# Patient Record
Sex: FEMALE | Race: BLACK OR AFRICAN AMERICAN | Employment: FULL TIME | URBAN - METROPOLITAN AREA
[De-identification: names, ages, dates, MRNs, and addresses within clinical notes are randomized per-mention and may not be internally consistent; named-entity substitution may affect disease eponyms.]

---

## 2019-02-10 ENCOUNTER — APPOINTMENT (OUTPATIENT)
Dept: GENERAL RADIOLOGY | Age: 59
End: 2019-02-10
Payer: COMMERCIAL

## 2019-02-10 ENCOUNTER — HOSPITAL ENCOUNTER (EMERGENCY)
Age: 59
Discharge: HOME | End: 2019-02-10
Payer: COMMERCIAL

## 2019-02-10 VITALS
SYSTOLIC BLOOD PRESSURE: 179 MMHG | DIASTOLIC BLOOD PRESSURE: 109 MMHG | HEART RATE: 92 BPM | TEMPERATURE: 98.9 F | OXYGEN SATURATION: 97 % | RESPIRATION RATE: 18 BRPM | WEIGHT: 250 LBS | HEIGHT: 62 IN | BODY MASS INDEX: 46.01 KG/M2

## 2019-02-10 DIAGNOSIS — M17.0 TRICOMPARTMENT OSTEOARTHRITIS OF KNEES, BILATERAL: Primary | ICD-10-CM

## 2019-02-10 PROCEDURE — 73560 X-RAY EXAM OF KNEE 1 OR 2: CPT

## 2019-02-10 PROCEDURE — 99283 EMERGENCY DEPT VISIT LOW MDM: CPT

## 2019-02-10 PROCEDURE — 6370000000 HC RX 637 (ALT 250 FOR IP): Performed by: PHYSICIAN ASSISTANT

## 2019-02-10 RX ORDER — HYDROCODONE BITARTRATE AND ACETAMINOPHEN 5; 325 MG/1; MG/1
1 TABLET ORAL ONCE
Status: COMPLETED | OUTPATIENT
Start: 2019-02-10 | End: 2019-02-10

## 2019-02-10 RX ORDER — PREDNISONE 20 MG/1
40 TABLET ORAL ONCE
Status: COMPLETED | OUTPATIENT
Start: 2019-02-10 | End: 2019-02-10

## 2019-02-10 RX ORDER — LISINOPRIL AND HYDROCHLOROTHIAZIDE 20; 12.5 MG/1; MG/1
1 TABLET ORAL DAILY
Qty: 30 TABLET | Refills: 1 | Status: SHIPPED | OUTPATIENT
Start: 2019-02-10

## 2019-02-10 RX ORDER — HYDROCODONE BITARTRATE AND ACETAMINOPHEN 5; 325 MG/1; MG/1
1 TABLET ORAL EVERY 8 HOURS PRN
Qty: 15 TABLET | Refills: 0 | Status: SHIPPED | OUTPATIENT
Start: 2019-02-10 | End: 2019-02-15

## 2019-02-10 RX ORDER — PREDNISONE 10 MG/1
50 TABLET ORAL DAILY
Qty: 20 TABLET | Refills: 0 | Status: SHIPPED | OUTPATIENT
Start: 2019-02-10 | End: 2019-02-14

## 2019-02-10 RX ADMIN — PREDNISONE 40 MG: 20 TABLET ORAL at 17:19

## 2019-02-10 RX ADMIN — HYDROCODONE BITARTRATE AND ACETAMINOPHEN 1 TABLET: 5; 325 TABLET ORAL at 17:19

## 2019-02-10 ASSESSMENT — PAIN DESCRIPTION - ORIENTATION: ORIENTATION: RIGHT;LEFT

## 2019-02-10 ASSESSMENT — PAIN DESCRIPTION - PROGRESSION: CLINICAL_PROGRESSION: GRADUALLY IMPROVING

## 2019-02-10 ASSESSMENT — PAIN SCALES - GENERAL
PAINLEVEL_OUTOF10: 8
PAINLEVEL_OUTOF10: 3

## 2019-02-10 ASSESSMENT — PAIN DESCRIPTION - LOCATION: LOCATION: KNEE

## 2021-09-16 ENCOUNTER — APPOINTMENT (OUTPATIENT)
Dept: GENERAL RADIOLOGY | Age: 61
End: 2021-09-16
Payer: MEDICARE

## 2021-09-16 ENCOUNTER — APPOINTMENT (OUTPATIENT)
Dept: CT IMAGING | Age: 61
End: 2021-09-16
Payer: MEDICARE

## 2021-09-16 ENCOUNTER — HOSPITAL ENCOUNTER (EMERGENCY)
Age: 61
Discharge: HOME | End: 2021-09-17
Attending: EMERGENCY MEDICINE
Payer: MEDICARE

## 2021-09-16 VITALS
TEMPERATURE: 97.8 F | BODY MASS INDEX: 49.38 KG/M2 | DIASTOLIC BLOOD PRESSURE: 90 MMHG | HEART RATE: 104 BPM | OXYGEN SATURATION: 90 % | SYSTOLIC BLOOD PRESSURE: 170 MMHG | WEIGHT: 270 LBS | RESPIRATION RATE: 24 BRPM

## 2021-09-16 DIAGNOSIS — Z20.822 SUSPECTED COVID-19 VIRUS INFECTION: ICD-10-CM

## 2021-09-16 DIAGNOSIS — J98.01 BRONCHOSPASM: Primary | ICD-10-CM

## 2021-09-16 LAB
A/G RATIO: 1.1 (ref 1.1–2.2)
ALBUMIN SERPL-MCNC: 4.2 G/DL (ref 3.4–5)
ALP BLD-CCNC: 227 U/L (ref 40–129)
ALT SERPL-CCNC: 297 U/L (ref 10–40)
ANION GAP SERPL CALCULATED.3IONS-SCNC: 12 MMOL/L (ref 3–16)
AST SERPL-CCNC: 262 U/L (ref 15–37)
BACTERIA: ABNORMAL /HPF
BASOPHILS ABSOLUTE: 0 K/UL (ref 0–0.2)
BASOPHILS RELATIVE PERCENT: 1.1 %
BILIRUB SERPL-MCNC: 0.6 MG/DL (ref 0–1)
BILIRUBIN URINE: ABNORMAL
BLOOD, URINE: ABNORMAL
BUN BLDV-MCNC: 8 MG/DL (ref 7–20)
CALCIUM SERPL-MCNC: 9.1 MG/DL (ref 8.3–10.6)
CHLORIDE BLD-SCNC: 98 MMOL/L (ref 99–110)
CLARITY: ABNORMAL
CO2: 25 MMOL/L (ref 21–32)
COLOR: ABNORMAL
CREAT SERPL-MCNC: 0.8 MG/DL (ref 0.6–1.2)
EOSINOPHILS ABSOLUTE: 0 K/UL (ref 0–0.6)
EOSINOPHILS RELATIVE PERCENT: 0.6 %
EPITHELIAL CELLS, UA: 3 /HPF (ref 0–5)
GFR AFRICAN AMERICAN: >60
GFR NON-AFRICAN AMERICAN: >60
GLOBULIN: 4 G/DL
GLUCOSE BLD-MCNC: 129 MG/DL (ref 70–99)
GLUCOSE URINE: NEGATIVE MG/DL
HCT VFR BLD CALC: 39 % (ref 36–48)
HEMOGLOBIN: 13 G/DL (ref 12–16)
HYALINE CASTS: 4 /LPF (ref 0–8)
INR BLD: 1.01 (ref 0.88–1.12)
KETONES, URINE: ABNORMAL MG/DL
LEUKOCYTE ESTERASE, URINE: NEGATIVE
LIPASE: 30 U/L (ref 13–60)
LYMPHOCYTES ABSOLUTE: 1.3 K/UL (ref 1–5.1)
LYMPHOCYTES RELATIVE PERCENT: 39.2 %
MAGNESIUM: 2.1 MG/DL (ref 1.8–2.4)
MCH RBC QN AUTO: 30.7 PG (ref 26–34)
MCHC RBC AUTO-ENTMCNC: 33.2 G/DL (ref 31–36)
MCV RBC AUTO: 92.4 FL (ref 80–100)
MICROSCOPIC EXAMINATION: YES
MONOCYTES ABSOLUTE: 0.4 K/UL (ref 0–1.3)
MONOCYTES RELATIVE PERCENT: 12.4 %
NEUTROPHILS ABSOLUTE: 1.6 K/UL (ref 1.7–7.7)
NEUTROPHILS RELATIVE PERCENT: 46.7 %
NITRITE, URINE: POSITIVE
PDW BLD-RTO: 14.1 % (ref 12.4–15.4)
PH UA: 6.5 (ref 5–8)
PLATELET # BLD: 267 K/UL (ref 135–450)
PLATELET SLIDE REVIEW: ADEQUATE
PMV BLD AUTO: 8.7 FL (ref 5–10.5)
POTASSIUM SERPL-SCNC: 3.9 MMOL/L (ref 3.5–5.1)
PRO-BNP: 100 PG/ML (ref 0–124)
PROTEIN UA: 30 MG/DL
PROTHROMBIN TIME: 11.4 SEC (ref 9.9–12.7)
RBC # BLD: 4.22 M/UL (ref 4–5.2)
RBC UA: 99 /HPF (ref 0–4)
SLIDE REVIEW: ABNORMAL
SODIUM BLD-SCNC: 135 MMOL/L (ref 136–145)
SPECIFIC GRAVITY UA: >1.03 (ref 1–1.03)
TOTAL PROTEIN: 8.2 G/DL (ref 6.4–8.2)
TROPONIN: <0.01 NG/ML
URINE REFLEX TO CULTURE: ABNORMAL
URINE TYPE: ABNORMAL
UROBILINOGEN, URINE: 1 E.U./DL
WBC # BLD: 3.3 K/UL (ref 4–11)
WBC UA: 3 /HPF (ref 0–5)

## 2021-09-16 PROCEDURE — 74177 CT ABD & PELVIS W/CONTRAST: CPT

## 2021-09-16 PROCEDURE — 94760 N-INVAS EAR/PLS OXIMETRY 1: CPT

## 2021-09-16 PROCEDURE — 83735 ASSAY OF MAGNESIUM: CPT

## 2021-09-16 PROCEDURE — 71250 CT THORAX DX C-: CPT

## 2021-09-16 PROCEDURE — 80053 COMPREHEN METABOLIC PANEL: CPT

## 2021-09-16 PROCEDURE — 81001 URINALYSIS AUTO W/SCOPE: CPT

## 2021-09-16 PROCEDURE — 96374 THER/PROPH/DIAG INJ IV PUSH: CPT

## 2021-09-16 PROCEDURE — U0005 INFEC AGEN DETEC AMPLI PROBE: HCPCS

## 2021-09-16 PROCEDURE — U0003 INFECTIOUS AGENT DETECTION BY NUCLEIC ACID (DNA OR RNA); SEVERE ACUTE RESPIRATORY SYNDROME CORONAVIRUS 2 (SARS-COV-2) (CORONAVIRUS DISEASE [COVID-19]), AMPLIFIED PROBE TECHNIQUE, MAKING USE OF HIGH THROUGHPUT TECHNOLOGIES AS DESCRIBED BY CMS-2020-01-R: HCPCS

## 2021-09-16 PROCEDURE — 84484 ASSAY OF TROPONIN QUANT: CPT

## 2021-09-16 PROCEDURE — 85610 PROTHROMBIN TIME: CPT

## 2021-09-16 PROCEDURE — 6360000004 HC RX CONTRAST MEDICATION: Performed by: EMERGENCY MEDICINE

## 2021-09-16 PROCEDURE — 36415 COLL VENOUS BLD VENIPUNCTURE: CPT

## 2021-09-16 PROCEDURE — 2580000003 HC RX 258: Performed by: PHYSICIAN ASSISTANT

## 2021-09-16 PROCEDURE — 83880 ASSAY OF NATRIURETIC PEPTIDE: CPT

## 2021-09-16 PROCEDURE — 93005 ELECTROCARDIOGRAM TRACING: CPT | Performed by: EMERGENCY MEDICINE

## 2021-09-16 PROCEDURE — 87150 DNA/RNA AMPLIFIED PROBE: CPT

## 2021-09-16 PROCEDURE — 99283 EMERGENCY DEPT VISIT LOW MDM: CPT

## 2021-09-16 PROCEDURE — 6370000000 HC RX 637 (ALT 250 FOR IP): Performed by: PHYSICIAN ASSISTANT

## 2021-09-16 PROCEDURE — 96375 TX/PRO/DX INJ NEW DRUG ADDON: CPT

## 2021-09-16 PROCEDURE — 85025 COMPLETE CBC W/AUTO DIFF WBC: CPT

## 2021-09-16 PROCEDURE — 87186 SC STD MICRODIL/AGAR DIL: CPT

## 2021-09-16 PROCEDURE — 71045 X-RAY EXAM CHEST 1 VIEW: CPT

## 2021-09-16 PROCEDURE — 94640 AIRWAY INHALATION TREATMENT: CPT

## 2021-09-16 PROCEDURE — 6360000002 HC RX W HCPCS: Performed by: EMERGENCY MEDICINE

## 2021-09-16 PROCEDURE — 83690 ASSAY OF LIPASE: CPT

## 2021-09-16 PROCEDURE — 87040 BLOOD CULTURE FOR BACTERIA: CPT

## 2021-09-16 RX ORDER — 0.9 % SODIUM CHLORIDE 0.9 %
1000 INTRAVENOUS SOLUTION INTRAVENOUS ONCE
Status: COMPLETED | OUTPATIENT
Start: 2021-09-16 | End: 2021-09-16

## 2021-09-16 RX ORDER — ALBUTEROL SULFATE 90 UG/1
2 AEROSOL, METERED RESPIRATORY (INHALATION) ONCE
Status: COMPLETED | OUTPATIENT
Start: 2021-09-16 | End: 2021-09-16

## 2021-09-16 RX ORDER — METHYLPREDNISOLONE SODIUM SUCCINATE 125 MG/2ML
125 INJECTION, POWDER, LYOPHILIZED, FOR SOLUTION INTRAMUSCULAR; INTRAVENOUS ONCE
Status: COMPLETED | OUTPATIENT
Start: 2021-09-16 | End: 2021-09-16

## 2021-09-16 RX ORDER — ALBUTEROL SULFATE 2.5 MG/3ML
2.5 SOLUTION RESPIRATORY (INHALATION) ONCE
Status: COMPLETED | OUTPATIENT
Start: 2021-09-16 | End: 2021-09-16

## 2021-09-16 RX ADMIN — METHYLPREDNISOLONE SODIUM SUCCINATE 125 MG: 125 INJECTION, POWDER, FOR SOLUTION INTRAMUSCULAR; INTRAVENOUS at 21:08

## 2021-09-16 RX ADMIN — SODIUM CHLORIDE 1000 ML: 9 INJECTION, SOLUTION INTRAVENOUS at 19:53

## 2021-09-16 RX ADMIN — Medication 1000 MG: at 21:29

## 2021-09-16 RX ADMIN — IOPAMIDOL 75 ML: 755 INJECTION, SOLUTION INTRAVENOUS at 15:04

## 2021-09-16 RX ADMIN — ALBUTEROL SULFATE 2.5 MG: 2.5 SOLUTION RESPIRATORY (INHALATION) at 21:18

## 2021-09-16 RX ADMIN — Medication 2 PUFF: at 16:52

## 2021-09-16 ASSESSMENT — PAIN SCALES - GENERAL: PAINLEVEL_OUTOF10: 5

## 2021-09-16 NOTE — ED PROVIDER NOTES
[facility] Emergency Department      Pt Name: Angie Brock  MRN: [id number]  [place] 1960  Date of evaluation: 9/16/2021  Provider: Louis Garrett MD  I independently performed a history and physical on Angie Brock. All diagnostic, treatment, and disposition decisions were made by myself in conjunction with the advanced practice provider. HPI: Angie Brock presented with   Chief Complaint   Patient presents with    Abdominal Pain     pt states she has acid going on in her stomach for about a year. PT states she has not seen doctor because she does not have insurance. PT is on BP meds but does not take them as she should because she does not see a doctor     Angie Brock has a past medical history of Arthritis, Bursitis, and Hypertension. She has a past surgical history that includes Tubal ligation. No current facility-administered medications on file prior to encounter. Current Outpatient Medications on File Prior to Encounter   Medication Sig Dispense Refill    lisinopril-hydrochlorothiazide (PRINZIDE;ZESTORETIC) 20-12.5 MG per tablet Take 1 tablet by mouth daily 30 tablet 1     PHYSICAL EXAM  Vitals: BP (!) 179/99   Pulse 93   Temp 97.8 °F (36.6 °C) (Oral)   Resp 18   Wt 270 lb  SpO2 94%   BMI 49.38 kg/m²   Constitutional:  63 y.o. female alert  HENT:  Atraumatic, oral mucosa moist  Neck:  No visible JVD, supple  Chest/Lungs:  Respiratory effort normal, wheezes bilaterally  Abdomen:  Non-distended, soft, NT  Back:  No gross deformity  Extremities:  Normal tone and perfusion, no edema    Medical Decision Making and Plan: Briefly, this is an 63 y. o.female who presented with abdominal discomfort, acid reflux feeling, cough and congestion for a week. She is oxygenating well at this point time but has bronchospasm and abnormal findings on CT imaging. She has mild leukopenia. Findings are suspicious for Covid infection.   I have added on CT chest imaging to further assess. I will turn over her care to Dr Bhanu Oneil at the end of my shift. For further details of [facility] encounter, please see documentation by advanced practice provider Diamond Phoenix, PA.     Labs Reviewed   URINE RT REFLEX TO CULTURE - Abnormal; Notable for the following components:       Result Value    Clarity, UA TURBID (*)     Bilirubin Urine SMALL (*)     Ketones, Urine TRACE (*)     Blood, Urine LARGE (*)     Protein, UA 30 (*)     Nitrite, Urine POSITIVE (*)     All other components within normal limits    Narrative:     Performed at:  [facility] [address]   Phone [phone]   CBC WITH AUTO DIFFERENTIAL - Abnormal; Notable for the following components:    WBC 3.3 (*)     Neutrophils Absolute 1.6 (*)     All other components within normal limits    Narrative:     Performed at:  [facility] [address]   Phone [phone]   COMPREHENSIVE METABOLIC PANEL - Abnormal; Notable for the following components:    Sodium 135 (*)     Chloride 98 (*)     Glucose 129 (*)     Alkaline Phosphatase 227 (*)      (*)      (*)     All other components within normal limits    Narrative:     Performed at:  [facility] [address], [employer]   Phone [phone]   MICROSCOPIC URINALYSIS - Abnormal; Notable for the following components:    Bacteria, UA 4+ (*)     RBC, UA 99 (*)     All other components within normal limits    Narrative:     Performed at:  [facility] [address]   Phone [phone]   CULTURE, BLOOD 1   CULTURE, BLOOD 2   MAGNESIUM    Narrative:     Performed at:  [facility] [address]   Phone [phone]   LIPASE    Narrative:     Performed at:  [facility]  [address],  [place], [address]   Phone [phone]   TROPONIN    Narrative:     Performed at:  [facility]  [address],  [place], [address]   Phone [phone]   BRAIN NATRIURETIC PEPTIDE    Narrative:     Performed at:  [facility]  [address],  [place], [address]   Phone [phone]   PROTIME-INR    Narrative:     Performed at:  [facility]  [address],  [place], [address]   Phone [phone]     RADIOLOGY:     Plain x-rays were viewed by me:   CT ABDOMEN PELVIS W IV CONTRAST Additional Contrast? None   Preliminary Result   1. No acute process within the abdomen or pelvis. 2. Punctate nonobstructing right nephrolithiasis, without evidence of a   ureteral calculus or hydronephrosis. 3. Colonic diverticulosis, without evidence of diverticulitis. 4. Diffuse hepatic steatosis. 5. Small sliding-type hiatal hernia. 6. Multiple scattered ground-glass nodular opacities throughout the bilateral   lung bases, the largest measuring 2.2 cm within the lingula. Suggest further   characterization with immediate chest CT evaluation. XR CHEST PORTABLE   Final Result   Bilateral airspace disease suggestive of edema. Superimposed pneumonia not   excluded.          CT CHEST WO CONTRAST    (Results Pending)     EKG:  Read by me in the absence of a cardiologist shows: Sinus rhythm, rate 98, right bundle branch block, repolarization abnormalities from conduction delay, nonspecific ST-T wave abnormality, minimal change when compared to 24 April 2017    Medications administered:  Medications   albuterol (PROVENTIL) nebulizer solution 2.5 mg (has no administration in time range)   cefTRIAXone (ROCEPHIN) 1000 mg in sterile water 10 mL IV syringe (has no administration in time range)   albuterol sulfate  (90 Base) MCG/ACT inhaler 2 puff (2 puffs Inhalation Given 9/16/21 1652)   0.9 % sodium chloride bolus (0 mLs IntraVENous Stopped 9/16/21 2059)   iopamidol (ISOVUE-370) 76 % injection 75 mL (75 mLs IntraVENous Given 9/16/21 1504)   methylPREDNISolone sodium (SOLU-MEDROL) injection 125 mg (125 mg IntraVENous Given 9/16/21 2108)        Tong Tellez MD  09/16/21 2114

## 2021-09-16 NOTE — ED PROVIDER NOTES
[facility]        Pt Name: Monika Zuleta  MRN: [id number]  [place] 1960  Date of evaluation: 9/16/2021  Provider: Ebony Wade PA-C  PCP: No primary care provider on file. Note Started: 2:00 PM EDT        I have seen and evaluated this patient with my supervising physician No att. providers found. CHIEF COMPLAINT       Chief Complaint   Patient presents with    Abdominal Pain     pt states she has acid going on in her stomach for about a year. PT states she has not seen doctor because she does not have insurance. PT is on BP meds but does not take them as she should because she does not see a doctor       HISTORY OF PRESENT ILLNESS   (Location, Timing/Onset, Context/Setting, Quality, Duration, Modifying Factors, Severity, Associated Signs and Symptoms)  Note limiting factors. Chief Complaint: to much acid    Monika Zuleta is a 63 y.o. female who presents with complaint of known GERD and has been on omeprazole OTC 20 mg times years. States it is not working. She does typically take her omeprazole at night before going to bed and at times with a snack. She states she has not had insurance for a while and recently acquired insurance. The patient describes 1 month history of progressive sore throat, neck pain, headache, chest burning pain, shortness of breath, fatigue with just walking around her house. This is new and progressive over the past 1 month. She does have known history of hypertension currently on lisinopril 20 mg. She quit smoking 6 months ago onset age 35 for an equivalent of 21 pack years. She has not had a Covid vaccination. She does have a wheezy character to her cough. Nursing Notes were all reviewed and agreed with or any disagreements were addressed in the HPI.     REVIEW OF SYSTEMS    (2-9 systems for level 4, 10 or more for level 5)     Review of Systems    Positives and Pertinent negatives as per HPI. Except as noted above in the ROS, all other systems were reviewed and negative. PAST MEDICAL HISTORY     Past Medical History:   Diagnosis Date    Arthritis     Bursitis     left shoulder    Hypertension          SURGICAL HISTORY     Past Surgical History:   Procedure Laterality Date    TUBAL LIGATION      also reversed         CURRENTMEDICATIONS       Discharge Medication List as of 2021 12:15 AM      CONTINUE these medications which have NOT CHANGED    Details   lisinopril-hydrochlorothiazide (PRINZIDE;ZESTORETIC) 20-12.5 MG per tablet Take 1 tablet by mouth daily, Disp-30 tablet, R-1Print               ALLERGIES     Patient has no known allergies. FAMILYHISTORY     History reviewed. No pertinent family history. SOCIAL HISTORY       Social History     Tobacco Use    Smoking status: Former Smoker     Packs/day: 0.25     Quit date: 3/5/2017     Years since quittin.5    Smokeless tobacco: Never Used   Substance Use Topics    Alcohol use: No    Drug use: No       SCREENINGS             PHYSICAL EXAM    (up to 7 for level 4, 8 or more for level 5)     ED Triage Vitals [21 1333]   BP Temp Temp Source Pulse Resp SpO2 Height Weight   (!) 190/118 97.8 °F (36.6 °C) Oral 100 20 95 % -- 270 lb (122.5 kg)       Physical Exam  Vitals and nursing note reviewed. Constitutional:       Appearance: Normal appearance. She is well-developed. She is obese. HENT:      Head: Normocephalic and atraumatic. Right Ear: External ear normal.      Left Ear: External ear normal.      Mouth/Throat:      Pharynx: Oropharynx is clear. Eyes:      General: No scleral icterus. Right eye: No discharge. Left eye: No discharge. Conjunctiva/sclera: Conjunctivae normal.   Cardiovascular:      Rate and Rhythm: Regular rhythm. Tachycardia present. Heart sounds: Normal heart sounds.    Pulmonary:      Effort: Pulmonary effort is normal.      Breath sounds: Wheezing - Abnormal; Notable for the following components:    SARS-CoV-2 Detected (*)     All other components within normal limits    Narrative:     Performed at:  [facility]  [address] [facility] [address]   Phone [phone]   MICROSCOPIC URINALYSIS - Abnormal; Notable for the following components:    Bacteria, UA 4+ (*)     RBC, UA 99 (*)     All other components within normal limits    Narrative:     Performed at:  [facility] [address] [employer],  [place], [address]   Phone [phone]   CULTURE, BLOOD 1   CULTURE, BLOOD 2   MAGNESIUM    Narrative:     Performed at:  [facility] [address] [facility] [place],  [place], [address]   Phone [phone]   LIPASE    Narrative:     Performed at:  [facility] [address] [facility] [place],  [place], [address]   Phone [phone]   TROPONIN    Narrative:     Performed at:  [facility] [address] [facility] [place],  [place], [address]   Phone [phone]   BRAIN NATRIURETIC PEPTIDE    Narrative:     Performed at:  [facility] [address] [facility] [place],  [place], [address]   Phone [phone]   PROTIME-INR    Narrative:     Performed at:  [facility] [address], [address]   Phone [phone]       When ordered only abnormal lab results are displayed. All other labs were within normal range or not returned as of this dictation. EKG: When ordered, EKG's are interpreted by the Emergency Department Physician in the absence of a cardiologist.  Please see their note for interpretation of EKG.     RADIOLOGY:   Non-plain film images such as CT, Ultrasound and MRI are read by the radiologist. Plain radiographic images are visualized and preliminarily interpreted by the ED Provider with the below findings:        Interpretation per the Radiologist below, if injection 75 mL (75 mLs IntraVENous Given 9/16/21 1504)   albuterol (PROVENTIL) nebulizer solution 2.5 mg (2.5 mg Nebulization Given 9/16/21 2118)   methylPREDNISolone sodium (SOLU-MEDROL) injection 125 mg (125 mg IntraVENous Given 9/16/21 2108)   cefTRIAXone (ROCEPHIN) 1000 mg in sterile water 10 mL IV syringe (1,000 mg IntraVENous Given 9/16/21 2129)           My shift ends. I placed orders. The final disposition is managed by my attending physician. FINAL IMPRESSION      1. Bronchospasm    2. Suspected COVID-19 virus infection          DISPOSITION/PLAN   DISPOSITION Decision To Discharge 09/16/2021 11:57:26 PM      PATIENT REFERRED TO:  No follow-up provider specified.     DISCHARGE MEDICATIONS:  Discharge Medication List as of 9/17/2021 12:15 AM      START taking these medications    Details   albuterol sulfate HFA (VENTOLIN HFA) 108 (90 Base) MCG/ACT inhaler Inhale 2 puffs into the lungs 4 times daily as needed for Wheezing, Disp-54 g, R-1Print      azithromycin (ZITHROMAX) 250 MG tablet Take 1 tablet by mouth See Admin Instructions for 5 days 500mg on day 1 followed by 250mg on days 2 - 5, Disp-6 tablet, R-0Print      aspirin EC 81 MG EC tablet Take 1 tablet by mouth daily, Disp-90 tablet, R-1Print             DISCONTINUED MEDICATIONS:  Discharge Medication List as of 9/17/2021 12:15 AM      STOP taking these medications       Omega-3 Fatty Acids (OMEGA-3 FISH OIL) 500 MG CAPS Comments:   Reason for Stopping:         Cholecalciferol (VITAMIN D3) 5000 units TABS Comments:   Reason for Stopping:         Glucosamine-Chondroitin (GLUCOSAMINE CHONDR COMPLEX PO) Comments:   Reason for Stopping:         omeprazole (PRILOSEC) 20 MG delayed release capsule Comments:   Reason for Stopping:         ibuprofen (ADVIL;MOTRIN) 200 MG tablet Comments:   Reason for Stopping:         ibuprofen (ADVIL;MOTRIN) 400 MG tablet Comments:   Reason for Stopping:                      (Please note that portions of this note were completed with a voice recognition program.  Efforts were made to edit the dictations but occasionally words are mis-transcribed. )    Jm Hernandez PA-C (electronically signed)           Jm Hernandez PA-C  09/17/21 4304

## 2021-09-16 NOTE — ED NOTES
Bed: 19  Expected date:   Expected time:   Means of arrival:   Comments:  Anusha Scott RN  09/16/21 3414

## 2021-09-17 LAB
EKG ATRIAL RATE: 98 BPM
EKG DIAGNOSIS: NORMAL
EKG P AXIS: 77 DEGREES
EKG P-R INTERVAL: 130 MS
EKG Q-T INTERVAL: 378 MS
EKG QRS DURATION: 126 MS
EKG QTC CALCULATION (BAZETT): 482 MS
EKG R AXIS: 46 DEGREES
EKG T AXIS: 45 DEGREES
EKG VENTRICULAR RATE: 98 BPM
REPORT: NORMAL
SARS-COV-2: DETECTED

## 2021-09-17 PROCEDURE — 93010 ELECTROCARDIOGRAM REPORT: CPT | Performed by: INTERNAL MEDICINE

## 2021-09-17 RX ORDER — ASPIRIN 81 MG/1
81 TABLET ORAL DAILY
Qty: 90 TABLET | Refills: 1 | Status: SHIPPED | OUTPATIENT
Start: 2021-09-17

## 2021-09-17 RX ORDER — ALBUTEROL SULFATE 90 UG/1
2 AEROSOL, METERED RESPIRATORY (INHALATION) 4 TIMES DAILY PRN
Qty: 54 G | Refills: 1 | Status: SHIPPED | OUTPATIENT
Start: 2021-09-16

## 2021-09-17 RX ORDER — AZITHROMYCIN 250 MG/1
250 TABLET, FILM COATED ORAL SEE ADMIN INSTRUCTIONS
Qty: 6 TABLET | Refills: 0 | Status: ON HOLD | OUTPATIENT
Start: 2021-09-17 | End: 2021-09-22 | Stop reason: HOSPADM

## 2021-09-17 NOTE — ED NOTES
Discharge instructions reviewed with patient, denies any questions, discharged to home.      Cal Baltazar RN  09/17/21 9608

## 2021-09-17 NOTE — ED PROVIDER NOTES
Emergency Department Encounter  Location: [address]    Patient: Fartun Beal  MRN: [id number]  : 1960  Date of evaluation: 2021  ED Provider: Davion Isaac MD      Fartun Beal was checked out to me by Dr. Ya Najera 2100 Please see his/her initial documentation for details of the patient's initial ED presentation, physical exam and completed studies. In brief, Fartun Beal is a 63 y.o. female that presented to the emergency department with stomach pain, malaise x 1 week.  Has not been vaccinated for covid19    I have reviewed and interpreted all of the currently available lab results and diagnostics from this visit:    Labs  Results for orders placed or performed during the hospital encounter of 21   Urinalysis Reflex to Culture    Specimen: Urine, clean catch   Result Value Ref Range    Color, UA DK YELLOW Straw/Yellow    Clarity, UA TURBID (A) Clear    Glucose, Ur Negative Negative mg/dL    Bilirubin Urine SMALL (A) Negative    Ketones, Urine TRACE (A) Negative mg/dL    Specific Gravity, UA >1.030 1.005 - 1.030    Blood, Urine LARGE (A) Negative    pH, UA 6.5 5.0 - 8.0    Protein, UA 30 (A) Negative mg/dL    Urobilinogen, Urine 1.0 <2.0 E.U./dL    Nitrite, Urine POSITIVE (A) Negative    Leukocyte Esterase, Urine Negative Negative    Microscopic Examination YES     Urine Type NotGiven     Urine Reflex to Culture Not Indicated    CBC Auto Differential   Result Value Ref Range    WBC 3.3 (L) 4.0 - 11.0 K/uL    RBC 4.22 4.00 - 5.20 M/uL    Hemoglobin 13.0 12.0 - 16.0 g/dL    Hematocrit 39.0 36.0 - 48.0 %    MCV 92.4 80.0 - 100.0 fL    MCH 30.7 26.0 - 34.0 pg    MCHC 33.2 31.0 - 36.0 g/dL    RDW 14.1 12.4 - 15.4 %    Platelets 746 675 - 721 K/uL    MPV 8.7 5.0 - 10.5 fL    PLATELET SLIDE REVIEW Adequate     SLIDE REVIEW see below     Neutrophils % 46.7 %    Lymphocytes % 39.2 %    Monocytes % 12.4 %    Eosinophils % 0.6 %    Basophils % 1.1 % Neutrophils Absolute 1.6 (L) 1.7 - 7.7 K/uL    Lymphocytes Absolute 1.3 1.0 - 5.1 K/uL    Monocytes Absolute 0.4 0.0 - 1.3 K/uL    Eosinophils Absolute 0.0 0.0 - 0.6 K/uL    Basophils Absolute 0.0 0.0 - 0.2 K/uL   Comprehensive metabolic panel   Result Value Ref Range    Sodium 135 (L) 136 - 145 mmol/L    Potassium 3.9 3.5 - 5.1 mmol/L    Chloride 98 (L) 99 - 110 mmol/L    CO2 25 21 - 32 mmol/L    Anion Gap 12 3 - 16    Glucose 129 (H) 70 - 99 mg/dL    BUN 8 7 - 20 mg/dL    CREATININE 0.8 0.6 - 1.2 mg/dL    GFR Non-African American >60 >60    GFR African American >60 >60    Calcium 9.1 8.3 - 10.6 mg/dL    Total Protein 8.2 6.4 - 8.2 g/dL    Albumin 4.2 3.4 - 5.0 g/dL    Albumin/Globulin Ratio 1.1 1.1 - 2.2    Total Bilirubin 0.6 0.0 - 1.0 mg/dL    Alkaline Phosphatase 227 (H) 40 - 129 U/L     (H) 10 - 40 U/L     (H) 15 - 37 U/L    Globulin 4.0 g/dL   Magnesium   Result Value Ref Range    Magnesium 2.10 1.80 - 2.40 mg/dL   Lipase   Result Value Ref Range    Lipase 30.0 13.0 - 60.0 U/L   Troponin   Result Value Ref Range    Troponin <0.01 <0.01 ng/mL   Brain Natriuretic Peptide   Result Value Ref Range    Pro- 0 - 124 pg/mL   Protime-INR   Result Value Ref Range    Protime 11.4 9.9 - 12.7 sec    INR 1.01 0.88 - 1.12   Microscopic Urinalysis   Result Value Ref Range    Bacteria, UA 4+ (A) None Seen /HPF    Hyaline Casts, UA 4 0 - 8 /LPF    WBC, UA 3 0 - 5 /HPF    RBC, UA 99 (H) 0 - 4 /HPF    Epithelial Cells, UA 3 0 - 5 /HPF   EKG 12 Lead   Result Value Ref Range    Ventricular Rate 98 BPM    Atrial Rate 98 BPM    P-R Interval 130 ms    QRS Duration 126 ms    Q-T Interval 378 ms    QTc Calculation (Bazett) 482 ms    P Axis 77 degrees    R Axis 46 degrees    T Axis 45 degrees    Diagnosis       Normal sinus rhythmPossible Left atrial enlargementRight bundle branch blockAbnormal ECG       Imaging  CT CHEST WO CONTRAST   Final Result   Hazy ground-glass opacities scattered in both lungs which probably represents   early multisegmental bronchopneumonia. This pattern of pneumonia has been   described with COVID-19 disease. Recommend correlating with lab values. Small hiatal hernia which is unchanged. Borderline enlarged lymph nodes in the mediastinum which are probably   reactive in etiology. Suggest follow-up. Mild chronic liver changes         CT ABDOMEN PELVIS W IV CONTRAST Additional Contrast? None   Preliminary Result   1. No acute process within the abdomen or pelvis. 2. Punctate nonobstructing right nephrolithiasis, without evidence of a   ureteral calculus or hydronephrosis. 3. Colonic diverticulosis, without evidence of diverticulitis. 4. Diffuse hepatic steatosis. 5. Small sliding-type hiatal hernia. 6. Multiple scattered ground-glass nodular opacities throughout the bilateral   lung bases, the largest measuring 2.2 cm within the lingula. Suggest further   characterization with immediate chest CT evaluation. XR CHEST PORTABLE   Final Result   Bilateral airspace disease suggestive of edema. Superimposed pneumonia not   excluded. Patient was signed out to me by Dr. Leyla Brown pending a CT chest to further assess for findings of groundglass opacities in bilateral lung bases. Patient is oxygenating well and initially presented for acid reflux feeling in the epigastric region. There is no acute surgical process noted on CT of the abdomen and pelvis. She remains hemodynamically stable here. Likely COVID-19, formal swab is pending. Albuterol to be prescribed for home-going in the setting of bronchospasm with suspected viral infection. Will treat with azithromycin for findings of CAP as well. will also prescribe sera aspirin x 14 days for DVT ppx. Patient given strict precautions to return for any new or worsening symptoms, trouble breathing, chest pain or shortness of breath. Otherwise stable for PCP follow-up in 1 week. She is agreeable to plan.     Final Impression  1. Bronchospasm    2. Suspected COVID-19 virus infection        Blood pressure (!) 170/90, pulse 104, temperature 97.8 °F (36.6 °C), temperature source Oral, resp. rate 24, weight 270 lb (122.5 kg), SpO2 90 %. Disposition:  DISPOSITION        Patient Referrals:  No follow-up provider specified. Discharge Medications:  New Prescriptions    ALBUTEROL SULFATE HFA (VENTOLIN HFA) 108 (90 BASE) MCG/ACT INHALER    Inhale 2 puffs into the lungs 4 times daily as needed for Wheezing    ASPIRIN EC 81 MG EC TABLET    Take 1 tablet by mouth daily    AZITHROMYCIN (ZITHROMAX) 250 MG TABLET    Take 1 tablet by mouth See Admin Instructions for 5 days 500mg on day 1 followed by 250mg on days 2 - 5          [employer]    This chart was generated using the [employer] dictation system. I created this record but it may contain dictation errors given the limitations of this technology.        Alf Wang MD  09/16/21 4765       Alf Wang MD  09/17/21 0002       Alf Wang MD  09/17/21 0364

## 2021-09-19 LAB
BLOOD CULTURE, ROUTINE: ABNORMAL
BLOOD CULTURE, ROUTINE: ABNORMAL
CULTURE, BLOOD 2: ABNORMAL
ORGANISM: ABNORMAL

## 2021-09-20 ENCOUNTER — APPOINTMENT (OUTPATIENT)
Dept: GENERAL RADIOLOGY | Age: 61
DRG: 720 | End: 2021-09-20
Payer: MEDICARE

## 2021-09-20 ENCOUNTER — HOSPITAL ENCOUNTER (INPATIENT)
Age: 61
LOS: 2 days | Discharge: HOME | DRG: 720 | End: 2021-09-22
Attending: EMERGENCY MEDICINE | Admitting: FAMILY MEDICINE
Payer: MEDICARE

## 2021-09-20 ENCOUNTER — CARE COORDINATION (OUTPATIENT)
Dept: CARE COORDINATION | Age: 61
End: 2021-09-20

## 2021-09-20 DIAGNOSIS — R65.20 SEVERE SEPSIS (HCC): Primary | ICD-10-CM

## 2021-09-20 DIAGNOSIS — R78.81 BACTEREMIA: ICD-10-CM

## 2021-09-20 DIAGNOSIS — U07.1 COVID-19: ICD-10-CM

## 2021-09-20 DIAGNOSIS — R79.89 ELEVATED LACTIC ACID LEVEL: ICD-10-CM

## 2021-09-20 DIAGNOSIS — A41.9 SEVERE SEPSIS (HCC): Primary | ICD-10-CM

## 2021-09-20 PROBLEM — B95.8 BACTEREMIA DUE TO STAPHYLOCOCCUS: Status: ACTIVE | Noted: 2021-09-20

## 2021-09-20 LAB
A/G RATIO: 1.1 (ref 1.1–2.2)
ALBUMIN SERPL-MCNC: 4 G/DL (ref 3.4–5)
ALP BLD-CCNC: 188 U/L (ref 40–129)
ALT SERPL-CCNC: 230 U/L (ref 10–40)
ANION GAP SERPL CALCULATED.3IONS-SCNC: 16 MMOL/L (ref 3–16)
AST SERPL-CCNC: 106 U/L (ref 15–37)
BASOPHILS ABSOLUTE: 0.1 K/UL (ref 0–0.2)
BASOPHILS RELATIVE PERCENT: 2.8 %
BILIRUB SERPL-MCNC: 0.5 MG/DL (ref 0–1)
BUN BLDV-MCNC: 11 MG/DL (ref 7–20)
CALCIUM SERPL-MCNC: 9.3 MG/DL (ref 8.3–10.6)
CHLORIDE BLD-SCNC: 101 MMOL/L (ref 99–110)
CO2: 21 MMOL/L (ref 21–32)
CREAT SERPL-MCNC: 0.7 MG/DL (ref 0.6–1.2)
EOSINOPHILS ABSOLUTE: 0.1 K/UL (ref 0–0.6)
EOSINOPHILS RELATIVE PERCENT: 2.3 %
GFR AFRICAN AMERICAN: >60
GFR NON-AFRICAN AMERICAN: >60
GLOBULIN: 3.8 G/DL
GLUCOSE BLD-MCNC: 125 MG/DL (ref 70–99)
HCT VFR BLD CALC: 38.5 % (ref 36–48)
HEMOGLOBIN: 12.8 G/DL (ref 12–16)
INR BLD: 0.98 (ref 0.88–1.12)
LACTIC ACID, SEPSIS: 1.2 MMOL/L (ref 0.4–1.9)
LACTIC ACID, SEPSIS: 2.3 MMOL/L (ref 0.4–1.9)
LYMPHOCYTES ABSOLUTE: 1.2 K/UL (ref 1–5.1)
LYMPHOCYTES RELATIVE PERCENT: 32 %
MAGNESIUM: 1.8 MG/DL (ref 1.8–2.4)
MCH RBC QN AUTO: 30.9 PG (ref 26–34)
MCHC RBC AUTO-ENTMCNC: 33.3 G/DL (ref 31–36)
MCV RBC AUTO: 92.7 FL (ref 80–100)
MONOCYTES ABSOLUTE: 0.4 K/UL (ref 0–1.3)
MONOCYTES RELATIVE PERCENT: 12 %
NEUTROPHILS ABSOLUTE: 1.9 K/UL (ref 1.7–7.7)
NEUTROPHILS RELATIVE PERCENT: 50.9 %
PDW BLD-RTO: 14.1 % (ref 12.4–15.4)
PLATELET # BLD: 282 K/UL (ref 135–450)
PMV BLD AUTO: 9.1 FL (ref 5–10.5)
POTASSIUM REFLEX MAGNESIUM: 3.4 MMOL/L (ref 3.5–5.1)
PROCALCITONIN: 0.06 NG/ML (ref 0–0.15)
PROTHROMBIN TIME: 11.1 SEC (ref 9.9–12.7)
RBC # BLD: 4.15 M/UL (ref 4–5.2)
SODIUM BLD-SCNC: 138 MMOL/L (ref 136–145)
TOTAL PROTEIN: 7.8 G/DL (ref 6.4–8.2)
WBC # BLD: 3.7 K/UL (ref 4–11)

## 2021-09-20 PROCEDURE — 84145 PROCALCITONIN (PCT): CPT

## 2021-09-20 PROCEDURE — 6360000002 HC RX W HCPCS: Performed by: PHYSICIAN ASSISTANT

## 2021-09-20 PROCEDURE — 2580000003 HC RX 258: Performed by: PHYSICIAN ASSISTANT

## 2021-09-20 PROCEDURE — 6360000002 HC RX W HCPCS: Performed by: FAMILY MEDICINE

## 2021-09-20 PROCEDURE — 96365 THER/PROPH/DIAG IV INF INIT: CPT

## 2021-09-20 PROCEDURE — 85610 PROTHROMBIN TIME: CPT

## 2021-09-20 PROCEDURE — 83735 ASSAY OF MAGNESIUM: CPT

## 2021-09-20 PROCEDURE — 1200000000 HC SEMI PRIVATE

## 2021-09-20 PROCEDURE — 85025 COMPLETE CBC W/AUTO DIFF WBC: CPT

## 2021-09-20 PROCEDURE — G0378 HOSPITAL OBSERVATION PER HR: HCPCS

## 2021-09-20 PROCEDURE — 96372 THER/PROPH/DIAG INJ SC/IM: CPT

## 2021-09-20 PROCEDURE — 80074 ACUTE HEPATITIS PANEL: CPT

## 2021-09-20 PROCEDURE — 36415 COLL VENOUS BLD VENIPUNCTURE: CPT

## 2021-09-20 PROCEDURE — 83605 ASSAY OF LACTIC ACID: CPT

## 2021-09-20 PROCEDURE — 80053 COMPREHEN METABOLIC PANEL: CPT

## 2021-09-20 PROCEDURE — 87040 BLOOD CULTURE FOR BACTERIA: CPT

## 2021-09-20 PROCEDURE — 2580000003 HC RX 258: Performed by: FAMILY MEDICINE

## 2021-09-20 PROCEDURE — 96366 THER/PROPH/DIAG IV INF ADDON: CPT

## 2021-09-20 PROCEDURE — 99283 EMERGENCY DEPT VISIT LOW MDM: CPT

## 2021-09-20 PROCEDURE — 94760 N-INVAS EAR/PLS OXIMETRY 1: CPT

## 2021-09-20 PROCEDURE — 96375 TX/PRO/DX INJ NEW DRUG ADDON: CPT

## 2021-09-20 PROCEDURE — 71045 X-RAY EXAM CHEST 1 VIEW: CPT

## 2021-09-20 RX ORDER — SODIUM CHLORIDE 0.9 % (FLUSH) 0.9 %
5-40 SYRINGE (ML) INJECTION EVERY 12 HOURS SCHEDULED
Status: DISCONTINUED | OUTPATIENT
Start: 2021-09-20 | End: 2021-09-22 | Stop reason: HOSPADM

## 2021-09-20 RX ORDER — METOPROLOL SUCCINATE 50 MG/1
50 TABLET, EXTENDED RELEASE ORAL DAILY
Status: DISCONTINUED | OUTPATIENT
Start: 2021-09-21 | End: 2021-09-22 | Stop reason: HOSPADM

## 2021-09-20 RX ORDER — SODIUM CHLORIDE 9 MG/ML
25 INJECTION, SOLUTION INTRAVENOUS PRN
Status: DISCONTINUED | OUTPATIENT
Start: 2021-09-20 | End: 2021-09-22 | Stop reason: HOSPADM

## 2021-09-20 RX ORDER — ONDANSETRON 4 MG/1
4 TABLET, ORALLY DISINTEGRATING ORAL EVERY 8 HOURS PRN
Status: DISCONTINUED | OUTPATIENT
Start: 2021-09-20 | End: 2021-09-22 | Stop reason: HOSPADM

## 2021-09-20 RX ORDER — ONDANSETRON 2 MG/ML
4 INJECTION INTRAMUSCULAR; INTRAVENOUS EVERY 6 HOURS PRN
Status: DISCONTINUED | OUTPATIENT
Start: 2021-09-20 | End: 2021-09-22 | Stop reason: HOSPADM

## 2021-09-20 RX ORDER — HYDROCHLOROTHIAZIDE 25 MG/1
12.5 TABLET ORAL DAILY
Status: DISCONTINUED | OUTPATIENT
Start: 2021-09-21 | End: 2021-09-21

## 2021-09-20 RX ORDER — LISINOPRIL AND HYDROCHLOROTHIAZIDE 20; 12.5 MG/1; MG/1
1 TABLET ORAL DAILY
Status: DISCONTINUED | OUTPATIENT
Start: 2021-09-21 | End: 2021-09-20 | Stop reason: CLARIF

## 2021-09-20 RX ORDER — LISINOPRIL 20 MG/1
20 TABLET ORAL DAILY
Status: DISCONTINUED | OUTPATIENT
Start: 2021-09-21 | End: 2021-09-22 | Stop reason: HOSPADM

## 2021-09-20 RX ORDER — HYDRALAZINE HYDROCHLORIDE 20 MG/ML
10 INJECTION INTRAMUSCULAR; INTRAVENOUS EVERY 4 HOURS PRN
Status: DISCONTINUED | OUTPATIENT
Start: 2021-09-20 | End: 2021-09-22 | Stop reason: HOSPADM

## 2021-09-20 RX ORDER — AMLODIPINE BESYLATE 5 MG/1
5 TABLET ORAL DAILY
Status: DISCONTINUED | OUTPATIENT
Start: 2021-09-21 | End: 2021-09-22 | Stop reason: HOSPADM

## 2021-09-20 RX ORDER — ALBUTEROL SULFATE 90 UG/1
2 AEROSOL, METERED RESPIRATORY (INHALATION) 4 TIMES DAILY PRN
Status: DISCONTINUED | OUTPATIENT
Start: 2021-09-20 | End: 2021-09-22 | Stop reason: HOSPADM

## 2021-09-20 RX ORDER — POLYETHYLENE GLYCOL 3350 17 G/17G
17 POWDER, FOR SOLUTION ORAL DAILY PRN
Status: DISCONTINUED | OUTPATIENT
Start: 2021-09-20 | End: 2021-09-22 | Stop reason: HOSPADM

## 2021-09-20 RX ORDER — ASPIRIN 81 MG/1
81 TABLET ORAL DAILY
Status: DISCONTINUED | OUTPATIENT
Start: 2021-09-21 | End: 2021-09-22 | Stop reason: HOSPADM

## 2021-09-20 RX ORDER — ACETAMINOPHEN 650 MG/1
650 SUPPOSITORY RECTAL EVERY 6 HOURS PRN
Status: DISCONTINUED | OUTPATIENT
Start: 2021-09-20 | End: 2021-09-22 | Stop reason: HOSPADM

## 2021-09-20 RX ORDER — ACETAMINOPHEN 325 MG/1
650 TABLET ORAL EVERY 6 HOURS PRN
Status: DISCONTINUED | OUTPATIENT
Start: 2021-09-20 | End: 2021-09-22 | Stop reason: HOSPADM

## 2021-09-20 RX ORDER — MULTIVIT-MIN/IRON/FOLIC ACID/K 18-600-40
CAPSULE ORAL
COMMUNITY

## 2021-09-20 RX ORDER — SODIUM CHLORIDE 0.9 % (FLUSH) 0.9 %
5-40 SYRINGE (ML) INJECTION PRN
Status: DISCONTINUED | OUTPATIENT
Start: 2021-09-20 | End: 2021-09-22 | Stop reason: HOSPADM

## 2021-09-20 RX ADMIN — HYDRALAZINE HYDROCHLORIDE 10 MG: 20 INJECTION INTRAMUSCULAR; INTRAVENOUS at 22:27

## 2021-09-20 RX ADMIN — ENOXAPARIN SODIUM 40 MG: 40 INJECTION SUBCUTANEOUS at 22:30

## 2021-09-20 RX ADMIN — VANCOMYCIN HYDROCHLORIDE 1000 MG: 1 INJECTION, POWDER, LYOPHILIZED, FOR SOLUTION INTRAVENOUS at 18:34

## 2021-09-20 RX ADMIN — Medication 10 ML: at 22:18

## 2021-09-20 ASSESSMENT — ENCOUNTER SYMPTOMS
DIARRHEA: 1
VOMITING: 0
CONSTIPATION: 0
CHEST TIGHTNESS: 0
COUGH: 1
ABDOMINAL PAIN: 0
COLOR CHANGE: 0
NAUSEA: 0
PHOTOPHOBIA: 0
BACK PAIN: 0
SHORTNESS OF BREATH: 0

## 2021-09-20 ASSESSMENT — PAIN SCALES - GENERAL
PAINLEVEL_OUTOF10: 0
PAINLEVEL_OUTOF10: 0

## 2021-09-20 NOTE — ED PROVIDER NOTES
As physician-in-triage, I performed a medical screening history and physical exam on Joni Burns. I also cared for and evaluated the patient in conjunction with the ED Advanced Practice Provider. All diagnostic, treatment, and disposition decisions were made by myself in conjunction with the advanced practice provider. For all further details of the patient's emergency department visit, please see the advanced practice provider's documentation. Patient resents the ER for evaluation blood culture positive staph species, in setting of Covid, she continues to have transaminase elevations leukopenia, elevated lactate. Mild tachycardia with accelerated hypertension. Patient will receive IV vancomycin repeat cultures and will be treated for possible bacteremia versus contaminant. Mental status intact no pulse deficit. She will be admitted for IV antibiotics of vancomycin which the staph subspecies was sensitive to. Impression: Bacteremia, COVID-19, elevated lactate, elevated transaminases    Total critical care time provided today was 31 minutes. This excludes seperately billable procedures and family discussion time. Critical care time provided for obtaining history, conducting a physical exam, performing and monitoring interventions, ordering, collecting and interpreting tests, and establishing medical decision-making. There was a potential for life/limb threatening pathology requiring close evaluation and intervention with concern for patient decompensation.        Jaqui Ochoa MD  90/80/95 0409       Jaqui Ochoa MD  43/48/65 [address] [facility] MD [place]  26/94/47 7415

## 2021-09-20 NOTE — ED NOTES
[facility]   Emergency Department Culture Follow-Up       Srini Costello (CSN: [id number]) was seen and evaluated at [facility] Emergency Department on 9/16/21 by provider  Dr. Quincy Gan. A blood culture was positive and is growing Staph hominis. Sensitivity results: Sensitive to tetracycline and Vancomycin      Treatment Course: The patient was treated and discharged with Azithromycin. Recommendation:    Recommends reevaluation and possibly readmission    This recommendation was reviewed with and agreed by ED provider Dr. Evelin Kerr. Follow-Up:    The patient was called and notified to come back to the ED for reevaluation d/t 2/2 blood cxs growing staph. Pt verbalized understanding.      Thank you,    Piter Rose, [facility]  9/20/2021

## 2021-09-21 LAB
A/G RATIO: 1 (ref 1.1–2.2)
ALBUMIN SERPL-MCNC: 3.7 G/DL (ref 3.4–5)
ALP BLD-CCNC: 170 U/L (ref 40–129)
ALT SERPL-CCNC: 201 U/L (ref 10–40)
ANION GAP SERPL CALCULATED.3IONS-SCNC: 13 MMOL/L (ref 3–16)
AST SERPL-CCNC: 101 U/L (ref 15–37)
BACTERIA: ABNORMAL /HPF
BILIRUB SERPL-MCNC: 0.7 MG/DL (ref 0–1)
BILIRUBIN URINE: ABNORMAL
BLOOD, URINE: ABNORMAL
BUN BLDV-MCNC: 11 MG/DL (ref 7–20)
C-REACTIVE PROTEIN: 22.2 MG/L (ref 0–5.1)
CALCIUM SERPL-MCNC: 9 MG/DL (ref 8.3–10.6)
CHLORIDE BLD-SCNC: 103 MMOL/L (ref 99–110)
CLARITY: ABNORMAL
CO2: 21 MMOL/L (ref 21–32)
COLOR: ABNORMAL
CREAT SERPL-MCNC: 0.6 MG/DL (ref 0.6–1.2)
D DIMER: 310 NG/ML DDU (ref 0–229)
EPITHELIAL CELLS, UA: ABNORMAL /HPF (ref 0–5)
GFR AFRICAN AMERICAN: >60
GFR NON-AFRICAN AMERICAN: >60
GLOBULIN: 3.6 G/DL
GLUCOSE BLD-MCNC: 118 MG/DL (ref 70–99)
GLUCOSE URINE: NEGATIVE MG/DL
HAV IGM SER IA-ACNC: NORMAL
HCT VFR BLD CALC: 35.4 % (ref 36–48)
HEMOGLOBIN: 11.8 G/DL (ref 12–16)
HEPATITIS B CORE IGM ANTIBODY: NORMAL
HEPATITIS B SURFACE ANTIGEN INTERPRETATION: NORMAL
HEPATITIS C ANTIBODY INTERPRETATION: NORMAL
KETONES, URINE: 15 MG/DL
LEUKOCYTE ESTERASE, URINE: NEGATIVE
MCH RBC QN AUTO: 30.7 PG (ref 26–34)
MCHC RBC AUTO-ENTMCNC: 33.4 G/DL (ref 31–36)
MCV RBC AUTO: 91.9 FL (ref 80–100)
MICROSCOPIC EXAMINATION: YES
NITRITE, URINE: NEGATIVE
PDW BLD-RTO: 14.2 % (ref 12.4–15.4)
PH UA: 6 (ref 5–8)
PLATELET # BLD: 302 K/UL (ref 135–450)
PMV BLD AUTO: 9.3 FL (ref 5–10.5)
POTASSIUM SERPL-SCNC: 3.7 MMOL/L (ref 3.5–5.1)
PROTEIN UA: 30 MG/DL
RBC # BLD: 3.85 M/UL (ref 4–5.2)
RBC UA: ABNORMAL /HPF (ref 0–4)
SODIUM BLD-SCNC: 137 MMOL/L (ref 136–145)
SPECIFIC GRAVITY UA: 1.03 (ref 1–1.03)
TOTAL PROTEIN: 7.3 G/DL (ref 6.4–8.2)
URINE REFLEX TO CULTURE: ABNORMAL
URINE TYPE: ABNORMAL
UROBILINOGEN, URINE: 1 E.U./DL
VANCOMYCIN RANDOM: <4 UG/ML
WBC # BLD: 5.6 K/UL (ref 4–11)
WBC UA: ABNORMAL /HPF (ref 0–5)

## 2021-09-21 PROCEDURE — 2500000003 HC RX 250 WO HCPCS: Performed by: INTERNAL MEDICINE

## 2021-09-21 PROCEDURE — 94761 N-INVAS EAR/PLS OXIMETRY MLT: CPT

## 2021-09-21 PROCEDURE — 80053 COMPREHEN METABOLIC PANEL: CPT

## 2021-09-21 PROCEDURE — 2580000003 HC RX 258: Performed by: FAMILY MEDICINE

## 2021-09-21 PROCEDURE — 2580000003 HC RX 258

## 2021-09-21 PROCEDURE — 81001 URINALYSIS AUTO W/SCOPE: CPT

## 2021-09-21 PROCEDURE — 85027 COMPLETE CBC AUTOMATED: CPT

## 2021-09-21 PROCEDURE — 6360000002 HC RX W HCPCS: Performed by: FAMILY MEDICINE

## 2021-09-21 PROCEDURE — 36415 COLL VENOUS BLD VENIPUNCTURE: CPT

## 2021-09-21 PROCEDURE — G0378 HOSPITAL OBSERVATION PER HR: HCPCS

## 2021-09-21 PROCEDURE — 2580000003 HC RX 258: Performed by: INTERNAL MEDICINE

## 2021-09-21 PROCEDURE — 85379 FIBRIN DEGRADATION QUANT: CPT

## 2021-09-21 PROCEDURE — 94640 AIRWAY INHALATION TREATMENT: CPT

## 2021-09-21 PROCEDURE — 6370000000 HC RX 637 (ALT 250 FOR IP): Performed by: FAMILY MEDICINE

## 2021-09-21 PROCEDURE — 80202 ASSAY OF VANCOMYCIN: CPT

## 2021-09-21 PROCEDURE — 96366 THER/PROPH/DIAG IV INF ADDON: CPT

## 2021-09-21 PROCEDURE — 6360000002 HC RX W HCPCS: Performed by: INTERNAL MEDICINE

## 2021-09-21 PROCEDURE — 86140 C-REACTIVE PROTEIN: CPT

## 2021-09-21 PROCEDURE — 96367 TX/PROPH/DG ADDL SEQ IV INF: CPT

## 2021-09-21 PROCEDURE — 1200000000 HC SEMI PRIVATE

## 2021-09-21 PROCEDURE — 99255 IP/OBS CONSLTJ NEW/EST HI 80: CPT | Performed by: INTERNAL MEDICINE

## 2021-09-21 RX ORDER — SODIUM CHLORIDE 9 MG/ML
INJECTION, SOLUTION INTRAVENOUS
Status: COMPLETED
Start: 2021-09-21 | End: 2021-09-21

## 2021-09-21 RX ADMIN — ENOXAPARIN SODIUM 40 MG: 40 INJECTION SUBCUTANEOUS at 20:53

## 2021-09-21 RX ADMIN — ALBUTEROL SULFATE 2 PUFF: 90 AEROSOL, METERED RESPIRATORY (INHALATION) at 04:55

## 2021-09-21 RX ADMIN — ASPIRIN 81 MG: 81 TABLET, COATED ORAL at 09:20

## 2021-09-21 RX ADMIN — HYDROCHLOROTHIAZIDE 12.5 MG: 25 TABLET ORAL at 09:20

## 2021-09-21 RX ADMIN — CASIRIVIMAB AND IMDEVIMAB: 600; 600 INJECTION, SOLUTION, CONCENTRATE INTRAVENOUS at 14:57

## 2021-09-21 RX ADMIN — Medication 10 ML: at 20:53

## 2021-09-21 RX ADMIN — VANCOMYCIN HYDROCHLORIDE 2000 MG: 10 INJECTION, POWDER, LYOPHILIZED, FOR SOLUTION INTRAVENOUS at 09:17

## 2021-09-21 RX ADMIN — SODIUM CHLORIDE 25 ML: 9 INJECTION, SOLUTION INTRAVENOUS at 14:55

## 2021-09-21 RX ADMIN — LISINOPRIL 20 MG: 20 TABLET ORAL at 09:20

## 2021-09-21 RX ADMIN — METOPROLOL SUCCINATE 50 MG: 50 TABLET, EXTENDED RELEASE ORAL at 09:20

## 2021-09-21 RX ADMIN — Medication 10 ML: at 09:15

## 2021-09-21 RX ADMIN — ENOXAPARIN SODIUM 40 MG: 40 INJECTION SUBCUTANEOUS at 09:20

## 2021-09-21 RX ADMIN — AMLODIPINE BESYLATE 5 MG: 5 TABLET ORAL at 09:20

## 2021-09-21 ASSESSMENT — ENCOUNTER SYMPTOMS
FACIAL SWELLING: 0
PHOTOPHOBIA: 0
STRIDOR: 0
SHORTNESS OF BREATH: 1
EYE REDNESS: 0
COUGH: 1
EYE DISCHARGE: 0
TROUBLE SWALLOWING: 0
CHEST TIGHTNESS: 0
NAUSEA: 0
CHOKING: 0
BLOOD IN STOOL: 0
ABDOMINAL PAIN: 0
DIARRHEA: 0
APNEA: 0
COLOR CHANGE: 0
RHINORRHEA: 0

## 2021-09-21 ASSESSMENT — PAIN SCALES - GENERAL
PAINLEVEL_OUTOF10: 0

## 2021-09-21 NOTE — CARE COORDINATION
CM reviewed chart. Pt is from home, called back for + blood culture. ID consult pending. Pt Covid + on 9/17. On room air. CM called Ann-Marie with [employer] [phone] and she will check IV benefits and find hc for pt's [place] insurance. Cm called room d/t + covid status and no answer at this time. Pt has no PCP listed, will need verified.     Win Garcia RN, BSN  [phone]

## 2021-09-21 NOTE — PROGRESS NOTES
Referral to check infusion benefits. Patients benefits information is as follows: Therapy: Vancomyocin 1 gm every 12 hours    Co- [address] covers drug and supplies 100%   Care  [facility]  has accepted this referral for home care. Benefits per: Ann-Marie @ [facility]. [facility] [phone]  / Office: [phone]    Discharge planner notified.

## 2021-09-21 NOTE — PROGRESS NOTES
Patient arrived to the unit in stable condition. Patient oriented to room and use of call light. Patient alert and oriented x4. Call light within reach, no other needs expressed.

## 2021-09-21 NOTE — H&P
HOSPITALISTS HISTORY AND PHYSICAL    9/20/2021 10:09 PM    Patient Information:  Norman Du is a 63 y.o. female [id number]  PCP:  No primary care provider on file. (Tel: None )    Chief complaint:    Chief Complaint   Patient presents with    Abnormal Lab     Told to return to ER for bactria in blood culture. History of Present Illness:  Malka Worley is a 63 y.o. female with h/o HTN , former smoker, was called by [facility] ED d.t abnormal blood cultures. She was seen at the ED 09/16 with abdominal pain , flu like symptoms, and dyspnea. The symptoms are On going for several weeks  The pt was not febrile of hypoxic. She was dc home with albuterol and Zpack. The blood cultures were drawn at the time . 2/2 bottles are positive for staphylococcus hominis . Pt has low grade fever 99.1. Chest xray showed airspace disease and pneumonia . sats are > 95% on RA  REVIEW OF SYSTEMS:   Constitutional: +Ve for fever,chills or night sweats  ENT: Negative for rhinorrhea, epistaxis, hoarseness, sore throat. Respiratory: +Ve  for shortness of breath,wheezing  Cardiovascular: Negative for chest pain, palpitations   Gastrointestinal: Negative for nausea, vomiting, diarrhea  Genitourinary: Negative for polyuria, dysuria   Hematologic/Lymphatic: Negative for bleeding tendency, easy bruising  Musculoskeletal: Negative for myalgias and arthralgias  Neurologic: Negative for confusion,dysarthria. Skin: Negative for itching,rash  Psychiatric: Negative for depression,anxiety, agitation. Endocrine: Negative for polydipsia,polyuria,heat /cold intolerance. Past Medical History:   has a past medical history of Arthritis, Bursitis, and Hypertension. Past Surgical History:   has a past surgical history that includes Tubal ligation. Medications:  No current facility-administered medications on file prior to encounter.      Current Outpatient Medications on File Prior to Encounter   Medication Sig Dispense Refill    Cholecalciferol (VITAMIN D) 50 MCG (2000 UT) CAPS capsule Take by mouth      albuterol sulfate HFA (VENTOLIN HFA) 108 (90 Base) MCG/ACT inhaler Inhale 2 puffs into the lungs 4 times daily as needed for Wheezing 54 g 1    azithromycin (ZITHROMAX) 250 MG tablet Take 1 tablet by mouth See Admin Instructions for 5 days 500mg on day 1 followed by 250mg on days 2 - 5 6 tablet 0    aspirin EC 81 MG EC tablet Take 1 tablet by mouth daily 90 tablet 1    lisinopril-hydrochlorothiazide (PRINZIDE;ZESTORETIC) 20-12.5 MG per tablet Take 1 tablet by mouth daily 30 tablet 1     Current Facility-Administered Medications   Medication Dose Route Frequency Provider Last Rate Last Admin    [START ON 9/21/2021] lisinopril-hydroCHLOROthiazide (PRINZIDE;ZESTORETIC) 20-12.5 MG per tablet 1 tablet  1 tablet Oral Daily Lauren Jalloh MD        albuterol sulfate  (90 Base) MCG/ACT inhaler 2 puff  2 puff Inhalation 4x Daily PRN MD Brenna Navas Bias ON 9/21/2021] aspirin EC tablet 81 mg  81 mg Oral Daily Lauren Jalloh MD        sodium chloride flush 0.9 % injection 5-40 mL  5-40 mL IntraVENous 2 times per day Shawn Bello MD        sodium chloride flush 0.9 % injection 5-40 mL  5-40 mL IntraVENous PRN Shawn Bello MD        0.9 % sodium chloride infusion  25 mL IntraVENous PRN MD Brenna Navas ON 9/21/2021] enoxaparin (LOVENOX) injection 40 mg  40 mg SubCUTAneous Daily Lauren Jalloh MD        ondansetron (ZOFRAN-ODT) disintegrating tablet 4 mg  4 mg Oral Q8H PRN Shawn Bello MD        Or    ondansetron (ZOFRAN) injection 4 mg  4 mg IntraVENous Q6H PRN Shawn Bello MD        polyethylene glycol (GLYCOLAX) packet 17 g  17 g Oral Daily PRN Shawn Bello MD        acetaminophen (TYLENOL) tablet 650 mg  650 mg Oral Q6H PRN Shawn Bello MD        Or   Brenna Proctor acetaminophen (TYLENOL) suppository 650 mg  650 mg Rectal Q6H PRN MD Brenna Navas 09/20/2021    CREATININE 0.7 09/20/2021    CALCIUM 9.3 09/20/2021    GFRAA >60 09/20/2021    LABGLOM >60 09/20/2021    GLUCOSE 125 09/20/2021       Chest Xray:   EKG:        Problem List  Active Problems:    Bacteremia due to Staphylococcus  Resolved Problems:    * No resolved hospital problems. *        Assessment/Plan:         1. Staphylococcus bacteremia 2/2 bottles  Started on IV Vancomycin   ID consulted     COVID pneumonia   Pt saturation s > 95% on RA  Supportive care at this time  Cultures pending       HTN uncontrolled 189/81  Started on Norvasc, and metoprolol  Cont home dose of HCTZ/ ACEi   Added prn hydralazine    Elevated liver enzymes   Could be d/t acute viral infection   Trending down compared to 09/16/21  Will get liver US and hep panel   Cont to monitor     Obesity BMI 54    Admit as inpatient. I anticipate hospitalization spanning mor than two midnights for investigation and treatment of the above medically necessary diagnoses.       Jhon Theodore MD    9/20/2021 10:09 PM

## 2021-09-21 NOTE — PROGRESS NOTES
Cranial nerves: II-XII intact, grossly non-focal.  Psychiatric: Alert and oriented, thought content appropriate, normal insight  Capillary Refill: Brisk,3 seconds, normal   Peripheral Pulses: +2 palpable, equal bilaterally       Labs:   Recent Labs     09/20/21  1302 09/21/21  0525   WBC 3.7* 5.6   HGB 12.8 11.8*   HCT 38.5 35.4*    302     Recent Labs     09/20/21  1302 09/21/21  0525    137   K 3.4* 3.7    103   CO2 21 21   BUN 11 11   CREATININE 0.7 0.6   CALCIUM 9.3 9.0     Recent Labs     09/20/21  1302 09/21/21  0525   * 101*   * 201*   BILITOT 0.5 0.7   ALKPHOS 188* 170*     Recent Labs     09/20/21  1302   INR 0.98     No results for input(s): Esteban Oiler in the last 72 hours. Urinalysis:      Lab Results   Component Value Date    NITRU Negative 09/21/2021    WBCUA 3-5 09/21/2021    BACTERIA Rare 09/21/2021    RBCUA 5-10 09/21/2021    BLOODU MODERATE 09/21/2021    SPECGRAV 1.028 09/21/2021    GLUCOSEU Negative 09/21/2021       Radiology:  XR CHEST PORTABLE   Final Result   Subtle, peripheral airspace disease. Pattern is common for COVID pneumonia. Other etiologies of pneumonia, typical or atypical etiology or eosinophilic   pneumonia are other possibilities. US LIVER    (Results Pending)           Assessment/Plan:    Active Hospital Problems    Diagnosis     Elevated lactic acid level [R79.89]     Pneumonia due to COVID-19 virus [U07.1, J12.82]     Lactic acidosis [E87.2]     Transaminitis [R74.01]     Mediastinal lymphadenopathy [R59.0]     Morbid obesity due to excess calories (HCC) [E66.01]     Weight loss counseling, encounter for [Z71.3]     Positive blood culture [R78.81]      Coag negative staph on blood cultures: 2 x 2. Thought to be contaminant. Procalcitonin level. Blood cultures negative. Antibiotics stopped by ID. COVID-19 infection: Chest x-ray with pneumonia. Tested positive on 9/16/2021. Onset of symptoms on 9/7/2021. Currently on room air. Unvaccinated. CRP 22.2. D-dimer 310. Procalcitonin 0.06. Discussed with ID. Give regen-cov antibodies today. Continue antitussives. Albuterol as needed. Hypertension: Continue Norvasc and metoprolol. ,  Hydrochlorothiazide and lisinopril      Transaminitis: Possibly secondary to COVID-19 infection. Acute hep panel negative. Right upper quadrant ultrasound pending. Morbid obesity: BMI 54.44    DVT Prophylaxis: Lovenox  Diet: ADULT DIET; Regular  Code Status: Full Code    PT/OT Eval Status: Not needed    Dispo - inpatient on Monday. Discharge possibly tomorrow.     Kit Curtis MD

## 2021-09-21 NOTE — CONSULTS
Clinical Pharmacy Note: Pharmacy to Dose Vancomcyin    Vancomycin Day: 2  Current Dosing Regimen: 1g q 12 hrs  Dosing Method: Bayesian Modeling    Random: <4    Recent Labs     09/20/21  1302 09/21/21  0525   BUN 11 11       Recent Labs     09/20/21  1302 09/21/21  0525   CREATININE 0.7 0.6       Recent Labs     09/20/21  1302 09/21/21  0525   WBC 3.7* 5.6         Intake/Output Summary (Last 24 hours) at 9/21/2021 0736  Last data filed at 9/20/2021 2130  Gross per 24 hour   Intake 250 ml   Output --   Net 250 ml         Ht Readings from Last 1 Encounters:   09/20/21 5' 3\" (1.6 m)        Wt Readings from Last 1 Encounters:   09/20/21 (!) 307 lb 14.4 oz (139.7 kg)         Body mass index is 54.54 kg/m². Estimated Creatinine Clearance: 136 mL/min (based on SCr of 0.6 mg/dL). Assessment/Plan:  Vancomycin level is subtherapeutic. Level was drawn appropriately in respect to last dose given. Increase vancomycin regimen to 2g  every 12 hours. Bayesian Modeling predicts an AUC of 529 mg/L*hr and trough of 8 mg/L. A vancomycin random level has been ordered on 9/23 at 0600 for follow-up. Changes in regimen will be determined based on culture results, renal function, and clinical response. Pharmacy will continue to monitor and adjust regimen as necessary.     Thank you for the consult,    Sinan OrozcoD, BCPS

## 2021-09-21 NOTE — CONSULTS
Infectious Diseases   Consult Note        Admission Date: 9/20/2021  Hospital Day: Hospital Day: 2   Attending: Sandra Ivory MD  Date of service: 9/21/21     Reason for admission: Bacteremia [R78.81]  Elevated lactic acid level [R79.89]  Bacteremia due to Staphylococcus [R78.81, B95.8]  Severe sepsis ([address] [A41.9, R65.20]  COVID-19 [U07.1]    Chief complaint/ Reason for consult: COVID-19 infection, positive blood cultures    Microbiology:        I have reviewed allavailable micro lab data and cultures    · Blood culture (2/2) - collected on 9/20/2021: In process      Antibiotics and immunizations:       Current antibiotics: All antibiotics and their doses were reviewed by me    Recent Abx Admin                   vancomycin (VANCOCIN) 2,000 mg in dextrose 5 % 500 mL IVPB (mg) 2,000 mg New Bag 09/21/21 0917    vancomycin 1000 mg IVPB in 250 mL D5W addavial (mg) 1,000 mg New Bag 09/20/21 1834                  Immunization History: All immunization history was reviewed by me today. There is no immunization history on file for this patient. Known drug allergies: All allergies were reviewed and updated    No Known Allergies    Social history:     Social History:  All social andepidemiologic history was reviewed and updated by me today as needed. · Tobacco use:   reports that she quit smoking about 4 years ago. She smoked 0.25 packs per day. She has never used smokeless tobacco.  · Alcohol use:   reports no history of alcohol use. · Currently lives in: [facility]  ·  reports no history of drug use. COVID VACCINATION AND LAB RESULT RECORDS:     Internal Administration   First Dose      Second Dose           Last COVID Lab SARS-CoV-2 (no units)   Date Value   09/16/2021 Detected (A)            Assessment:     The patient is a 63 y.o. old female who  has a past medical history of Arthritis, Bursitis, and Hypertension.  with following problems:    · COVID-19 pneumonia  · Unvaccinated against COVID-19  · Lactic acidosis  · Positive blood cultures for Staph hominis, contaminant from the skin  · Transaminitis, likely in setting of COVID-19  · Mediastinal lymphadenopathy, likely in setting of COVID-19  · Obesity Class 3 due to excess calorie intake : Body mass index is 54.54 kg/m². ·       Discussion:      The patient is unvaccinated against COVID-19 and has been admitted with concern for positive blood culture. I reviewed patient's culture results in detail. Both sets of blood cultures were obtained on the same date at the same time from the same site i.e left antecubital fossa, hand has technically represent 1 set and not two sets of blood cultures. The Staphylococcus hominis isolated in this set of blood culture is very likely contaminant from the skin. I do not think patient needs further antibiotics. As far as COVID-19 is concerned, the patient is unvaccinated. Due to her age, morbid obesity, she is at significant risk of complications from [id number]    Her procalcitonin was also 0.06, which goes against a bacterial process      I reviewed her portable chest x-ray from yesterday. She is developing bilateral atypical lung infiltrates, classic for COVID-19. She is quite early in the COVID-19 disease at this time    Plan:     Diagnostic Workup:    · Agree with repeat blood cultures  · Will order baseline CRP, LDH level  · Continue to follow fever curve, WBC count and blood cultures  · Follow up on liverand renal functions closely    Antimicrobials:    · Will stop IV vancomycin. Staph hominis in the blood culture is highly likely a contaminant from the skin. No need of antibiotics  · The patient is at high risk of complicated report from [id number] pneumonia as discussed above. The patient is currently saturating 95 to 96% on room air. · The patient would be a candidate for COVID-19 Regeneron antibody treatment at this time.   This was discussed with the patient and she has consented for the treatment with Regeneron monoclonal antibodies. Will place orders for pharmacy for the same. · If the patient starts becoming hypoxic, keep a low threshold to start remdesivir and systemic steroids. If develops worsening respiratory status, may also need pulmonary involvement  · Cough and deep breathing exercises  · Continue supportive care measures for COVID-19 infection  · Continue close vitals monitoring. · Maintain good glycemic control. · Fall precautions. Aspiration precautions. · Continue to watch for new fever or diarrhea. · DVT prophylaxis. · Discussed all above with patient and RN. · Discussed with Dr. Cain See  · I will continue to follow remotely and will see the patient on a as needed basis    I/v access Management:    · Continue to monitor i.v access sites for erythema, induration, discharge or tenderness. · As always, continue efforts to minimizetubes/lines/drains as clinically appropriate to reduce chances of line associated infections. Current isolation precautions:    Currently active isolation(s): Droplet Plus, C Diff Contact     Weight loss counseling:    Extensive weight loss counseling was done. It is important to set a realistic weight loss goal. First goal should be to avoid gaining more weight and staying at current weight (or within 5 percent). People at high risk of developing diabetes who are able to lose 5 percent of their body weight and maintain this weight will reduce their risk of developing diabetes by about 50 percent and reduce their blood pressure. Losing more than 15 percent of  body weight and staying at this weight is an extremely good result, even if you never reach your \"dream\" or \"ideal\" weight.     Lifestyle changes including changing eating habits, substituting excess carbohydrates with proteins, stress reduction, using self-help programs like Weight Watchers®, Overeaters Anonymous®, and Take Off Pounds Sensibly (TOPS)© , following DASH diet and increasing exercise or walking briskly daily for half hour to and hour 5-7 days a week was suggested among other measures. Information was given about various weight loss education programs and their websites like www.cdc.gov/healthyweight, www.choosemyplate.gov and www.health.gov/dietaryguidelines/    Level of complexity of consult: High     Risk of Complications/Morbidity: High     · Illness(es)/ Infection present that pose threat to life/bodily function. Thank you for involving me in the care of your patient. I will continue to follow. If you have any additional questions, please do not hesitate to contact me. Subjective:     Presenting complaint in ER:     Chief Complaint   Patient presents with    Abnormal Lab     Told to return to ER for bactria in blood culture. HPI: Zenaida Leone is a 63 y.o. female patient, who was seen at the request of Dr. Deisy Tello MD.    History was obtained from chart review and the patient. The patient was admitted on 9/20/2021. I have been consulted to see the patient for above mentioned reason(s). The patient has multiple medical comorbidities, and presented to the ER after she was called to the ER for positive blood cultures. The patient had previously presented to the ER on 9/16/2021 and had tested positive for COVID-19. He had blood cultures done in the ER which came back positive for Staphylococcus epidermidis. As, she was called back to the ER by the ER staff yesterday and has been admitted    I have been asked for my opinion for management for this patient. Past Medical History: All past medical history reviewed today. Past Medical History:   Diagnosis Date    Arthritis     Bursitis     left shoulder    Hypertension          Past Surgical History: All pastsurgical history was reviewed today.     Past Surgical History:   Procedure Laterality Date    TUBAL LIGATION      also reversed         Family History: All family history was reviewed today. History reviewed. No pertinent family history. Medications: All current and past medications were reviewed. Medications Prior to Admission: Cholecalciferol (VITAMIN D) 50 MCG (2000 UT) CAPS capsule, Take by mouth  albuterol sulfate HFA (VENTOLIN HFA) 108 (90 Base) MCG/ACT inhaler, Inhale 2 puffs into the lungs 4 times daily as needed for Wheezing  azithromycin (ZITHROMAX) 250 MG tablet, Take 1 tablet by mouth See Admin Instructions for 5 days 500mg on day 1 followed by 250mg on days 2 - 5  aspirin EC 81 MG EC tablet, Take 1 tablet by mouth daily  lisinopril-hydrochlorothiazide (PRINZIDE;ZESTORETIC) 20-12.5 MG per tablet, Take 1 tablet by mouth daily     vancomycin  2,000 mg IntraVENous Q12H    aspirin EC  81 mg Oral Daily    sodium chloride flush  5-40 mL IntraVENous 2 times per day    enoxaparin  40 mg SubCUTAneous BID    metoprolol succinate  50 mg Oral Daily    amLODIPine  5 mg Oral Daily    lisinopril  20 mg Oral Daily    And    hydroCHLOROthiazide  12.5 mg Oral Daily          REVIEW OF SYSTEMS:       Review of Systems   Constitutional: Positive for fatigue. Negative for chills, diaphoresis and unexpected weight change. HENT: Negative for congestion, ear discharge, ear pain, facial swelling, hearing loss, rhinorrhea and trouble swallowing. Eyes: Negative for photophobia, discharge, redness and visual disturbance. Respiratory: Positive for cough and shortness of breath. Negative for apnea, choking, chest tightness and stridor. Cardiovascular: Negative for chest pain and palpitations. Gastrointestinal: Negative for abdominal pain, blood in stool, diarrhea and nausea. Endocrine: Negative for polydipsia, polyphagia and polyuria. Genitourinary: Negative for difficulty urinating, dysuria, frequency, hematuria, menstrual problem and vaginal discharge. Musculoskeletal: Negative for arthralgias, joint swelling, myalgias and neck stiffness. Skin: Negative for color change and rash. Allergic/Immunologic: Negative for immunocompromised state. Neurological: Negative for dizziness, seizures, speech difficulty, light-headedness and headaches. Hematological: Negative for adenopathy. Psychiatric/Behavioral: Negative for agitation, hallucinations and suicidal ideas. Objective:       PHYSICAL EXAM:      Vitals:   Vitals:    09/20/21 2341 09/21/21 0446 09/21/21 0456 09/21/21 0915   BP: (!) 174/89 (!) 151/85  (!) 145/72   Pulse: 102 93  89   Resp: 16 16 16 16   Temp: 98.1 °F (36.7 °C) 99 °F (37.2 °C)  96.6 °F (35.9 °C)   TempSrc: Oral Oral  Axillary   SpO2: 95% 97% 96%    Weight:       Height:           Physical Exam  Vitals and nursing note reviewed. Constitutional:       General: She is not in acute distress. Appearance: She is well-developed. She is not diaphoretic. Comments: Morbidly obese   HENT:      Head: Normocephalic. Right Ear: External ear normal.      Left Ear: External ear normal.      Nose: Nose normal.   Eyes:      General: No scleral icterus. Right eye: No discharge. Left eye: No discharge. Conjunctiva/sclera: Conjunctivae normal.      Pupils: Pupils are equal, round, and reactive to light. Cardiovascular:      Rate and Rhythm: Normal rate and regular rhythm. Heart sounds: No murmur heard. No friction rub. Pulmonary:      Effort: Pulmonary effort is normal.      Breath sounds: No stridor. Rales present. No wheezing. Comments: B/l, patchy  Chest:      Chest wall: No tenderness. Abdominal:      Palpations: Abdomen is soft. There is no mass. Tenderness: There is no abdominal tenderness. There is no guarding or rebound. Musculoskeletal:         General: No tenderness. Cervical back: Normal range of motion and neck supple. Lymphadenopathy:      Cervical: No cervical adenopathy. Skin:     General: Skin is warm and dry. Findings: No erythema or rash. Neurological:      Mental Status: She is alert and oriented to person, place, and time. Motor: No abnormal muscle tone. Psychiatric:         Judgment: Judgment normal.           Lines and drains: All vascular access sites are healthy with no local erythema, discharge or tenderness. Intake and output:     I/O last 3 completed shifts: In: 250 [IV Piggyback:250]  Out: -     Lab Data:   All available labs were reviewed by me today. CBC:   Recent Labs     09/20/21  1302 09/21/21  0525   WBC 3.7* 5.6   RBC 4.15 3.85*   HGB 12.8 11.8*   HCT 38.5 35.4*    302   MCV 92.7 91.9   MCH 30.9 30.7   MCHC 33.3 33.4   RDW 14.1 14.2        BMP:  Recent Labs     09/20/21  1302 09/21/21  0525    137   K 3.4* 3.7    103   CO2 21 21   BUN 11 11   CREATININE 0.7 0.6   CALCIUM 9.3 9.0   GLUCOSE 125* 118*        Hepatic FunctionPanel:   Lab Results   Component Value Date    ALKPHOS 170 09/21/2021     09/21/2021     09/21/2021    PROT 7.3 09/21/2021    BILITOT 0.7 09/21/2021    LABALBU 3.7 09/21/2021       CPK: No results found for: CKTOTAL  ESR: No results found for: SEDRATE  CRP: No results found for: CRP      Imaging: All pertinent images and reports for the current visit were reviewed by meduring this visit. XR CHEST PORTABLE   Final Result   Subtle, peripheral airspace disease. Pattern is common for COVID pneumonia. Other etiologies of pneumonia, typical or atypical etiology or eosinophilic   pneumonia are other possibilities. US LIVER    (Results Pending)       Outside records:    Labs, Microbiology, Radiology and pertinent results from Care everywhere, if available, were reviewed as a part ofthe consultation. Problem list:       Patient Active Problem List   Diagnosis Code    Bacteremia due to Staphylococcus R78.81, B95.8         Please note that this chart was generated using Dragon dictation software.  Although every effort was made to ensure the accuracy of

## 2021-09-22 ENCOUNTER — APPOINTMENT (OUTPATIENT)
Dept: ULTRASOUND IMAGING | Age: 61
DRG: 720 | End: 2021-09-22
Payer: MEDICARE

## 2021-09-22 VITALS
RESPIRATION RATE: 16 BRPM | HEART RATE: 80 BPM | BODY MASS INDEX: 51.91 KG/M2 | TEMPERATURE: 98 F | DIASTOLIC BLOOD PRESSURE: 73 MMHG | OXYGEN SATURATION: 98 % | HEIGHT: 63 IN | WEIGHT: 293 LBS | SYSTOLIC BLOOD PRESSURE: 120 MMHG

## 2021-09-22 LAB
ALBUMIN SERPL-MCNC: 3.6 G/DL (ref 3.4–5)
ALP BLD-CCNC: 166 U/L (ref 40–129)
ALT SERPL-CCNC: 206 U/L (ref 10–40)
ANION GAP SERPL CALCULATED.3IONS-SCNC: 13 MMOL/L (ref 3–16)
AST SERPL-CCNC: 132 U/L (ref 15–37)
BILIRUB SERPL-MCNC: 0.6 MG/DL (ref 0–1)
BILIRUBIN DIRECT: <0.2 MG/DL (ref 0–0.3)
BILIRUBIN, INDIRECT: ABNORMAL MG/DL (ref 0–1)
BUN BLDV-MCNC: 10 MG/DL (ref 7–20)
C-REACTIVE PROTEIN: 18.9 MG/L (ref 0–5.1)
CALCIUM SERPL-MCNC: 9 MG/DL (ref 8.3–10.6)
CHLORIDE BLD-SCNC: 101 MMOL/L (ref 99–110)
CO2: 24 MMOL/L (ref 21–32)
CREAT SERPL-MCNC: 0.8 MG/DL (ref 0.6–1.2)
GFR AFRICAN AMERICAN: >60
GFR NON-AFRICAN AMERICAN: >60
GLUCOSE BLD-MCNC: 104 MG/DL (ref 70–99)
HCT VFR BLD CALC: 34.8 % (ref 36–48)
HEMOGLOBIN: 11.5 G/DL (ref 12–16)
MCH RBC QN AUTO: 30.8 PG (ref 26–34)
MCHC RBC AUTO-ENTMCNC: 33.2 G/DL (ref 31–36)
MCV RBC AUTO: 92.7 FL (ref 80–100)
PDW BLD-RTO: 14.1 % (ref 12.4–15.4)
PLATELET # BLD: 296 K/UL (ref 135–450)
PMV BLD AUTO: 8.7 FL (ref 5–10.5)
POTASSIUM SERPL-SCNC: 3.7 MMOL/L (ref 3.5–5.1)
RBC # BLD: 3.75 M/UL (ref 4–5.2)
SODIUM BLD-SCNC: 138 MMOL/L (ref 136–145)
TOTAL PROTEIN: 7.1 G/DL (ref 6.4–8.2)
WBC # BLD: 3.5 K/UL (ref 4–11)

## 2021-09-22 PROCEDURE — 96372 THER/PROPH/DIAG INJ SC/IM: CPT

## 2021-09-22 PROCEDURE — 80048 BASIC METABOLIC PNL TOTAL CA: CPT

## 2021-09-22 PROCEDURE — 76705 ECHO EXAM OF ABDOMEN: CPT

## 2021-09-22 PROCEDURE — 85027 COMPLETE CBC AUTOMATED: CPT

## 2021-09-22 PROCEDURE — 2580000003 HC RX 258: Performed by: FAMILY MEDICINE

## 2021-09-22 PROCEDURE — 80076 HEPATIC FUNCTION PANEL: CPT

## 2021-09-22 PROCEDURE — 6360000002 HC RX W HCPCS: Performed by: FAMILY MEDICINE

## 2021-09-22 PROCEDURE — 6370000000 HC RX 637 (ALT 250 FOR IP): Performed by: FAMILY MEDICINE

## 2021-09-22 PROCEDURE — 36415 COLL VENOUS BLD VENIPUNCTURE: CPT

## 2021-09-22 PROCEDURE — 86140 C-REACTIVE PROTEIN: CPT

## 2021-09-22 PROCEDURE — G0378 HOSPITAL OBSERVATION PER HR: HCPCS

## 2021-09-22 RX ORDER — AMLODIPINE BESYLATE 5 MG/1
5 TABLET ORAL DAILY
Qty: 30 TABLET | Refills: 3 | Status: SHIPPED | OUTPATIENT
Start: 2021-09-23 | End: 2021-09-22

## 2021-09-22 RX ORDER — AMLODIPINE BESYLATE 5 MG/1
5 TABLET ORAL DAILY
Qty: 30 TABLET | Refills: 2 | Status: SHIPPED | OUTPATIENT
Start: 2021-09-23

## 2021-09-22 RX ADMIN — METOPROLOL SUCCINATE 50 MG: 50 TABLET, EXTENDED RELEASE ORAL at 08:22

## 2021-09-22 RX ADMIN — ASPIRIN 81 MG: 81 TABLET, COATED ORAL at 08:22

## 2021-09-22 RX ADMIN — Medication 10 ML: at 08:23

## 2021-09-22 RX ADMIN — ENOXAPARIN SODIUM 40 MG: 40 INJECTION SUBCUTANEOUS at 08:22

## 2021-09-22 RX ADMIN — AMLODIPINE BESYLATE 5 MG: 5 TABLET ORAL at 08:22

## 2021-09-22 RX ADMIN — LISINOPRIL 20 MG: 20 TABLET ORAL at 08:22

## 2021-09-22 NOTE — PROGRESS NOTES
Am assessment complete, vss, alert and oriented, up independently, call light and phone by side, am medications given, all patient questions answered, NPO for Liver US, will continue to monitor.  Dyan Hamm RN

## 2021-09-22 NOTE — DISCHARGE SUMMARY
Hospital Medicine Discharge Summary    Patient ID: Nima Salguero      Patient's PCP: No primary care provider on file. Admit Date: 9/20/2021     Discharge Date:   9/22/2021    Admitting Physician: Dat Walker MD     Discharge Physician: Augusta Russell MD     Discharge Diagnoses: Active Hospital Problems    Diagnosis     Elevated lactic acid level [R79.89]     Pneumonia due to COVID-19 virus [U07.1, J12.82]     Lactic acidosis [E87.2]     Transaminitis [R74.01]     Mediastinal lymphadenopathy [R59.0]     Morbid obesity due to excess calories (HCC) [E66.01]     Weight loss counseling, encounter for [Z71.3]     Positive blood culture [R78.81]        The patient was seen and examined on day of discharge and this discharge summary is in conjunction with any daily progress note from day of discharge. Hospital Course:   69-year-old female with history of hypertension, morbid obesity recently tested positive for COVID-19 on 9/16/2021 was called in due to positive blood cultures. She was treated as follows:    Coag negative staph on blood cultures: 2/ 2 cultures positive. Sec to contamination. Procalcitonin level not elevated. .  Initially started on antibiotics. Repeat Blood cultures negative. ID consulted. Antibiotics stopped.     COVID-19 infection: Chest x-ray with pneumonia. Tested positive on 9/16/2021. Onset of symptoms on 9/7/2021. Currently on room air. Unvaccinated. CRP 22.2. D-dimer 310. Procalcitonin 0.06.    regen-cov antibody given while in the here. Treated symptomatically with antitussives and albuterol     Hypertension: Blood pressure was high. metoprolol. ,  Hydrochlorothiazide and lisinopril continued. Norvasc added to the regimen for better bp control         Transaminitis: Possibly secondary to COVID-19 infection. Acute hep panel negative.   Right upper quadrant ultrasound showed hepatomegaly with diffuse hepatic steatosis and cholelithiasis without evidence of cholecystitis. Morbid obesity: BMI 54.44              Physical Exam Performed:     /79   Pulse 82   Temp 98.1 °F (36.7 °C) (Oral)   Resp 16   Ht 5' 3\" (1.6 m)   Wt (!) 307 lb 14.4 oz (139.7 kg)   SpO2 98%   BMI 54.54 kg/m²       General appearance:  No apparent distress, appears stated age and cooperative. HEENT:  Normal cephalic, atraumatic without obvious deformity. Pupils equal, round, and reactive to light. Extra ocular muscles intact. Conjunctivae/corneas clear. Neck: Supple, with full range of motion. No jugular venous distention. Trachea midline. Respiratory:  Normal respiratory effort. Clear to auscultation, bilaterally without Rales/Wheezes/Rhonchi. Cardiovascular:  Regular rate and rhythm with normal S1/S2 without murmurs, rubs or gallops. Abdomen: Soft, non-tender, non-distended with normal bowel sounds. Musculoskeletal:  No clubbing, cyanosis or edema bilaterally. Full range of motion without deformity. Skin: Skin color, texture, turgor normal.  No rashes or lesions. Neurologic:  Neurovascularly intact without any focal sensory/motor deficits. Cranial nerves: II-XII intact, grossly non-focal.  Psychiatric:  Alert and oriented, thought content appropriate, normal insight  Capillary Refill: Brisk,< 3 seconds   Peripheral Pulses: +2 palpable, equal bilaterally       Labs: For convenience and continuity at follow-up the following most recent labs are provided:      CBC:    Lab Results   Component Value Date    WBC 3.5 09/22/2021    HGB 11.5 09/22/2021    HCT 34.8 09/22/2021     09/22/2021       Renal:    Lab Results   Component Value Date     09/22/2021    K 3.7 09/22/2021    K 3.4 09/20/2021     09/22/2021    CO2 24 09/22/2021    BUN 10 09/22/2021    CREATININE 0.8 09/22/2021    CALCIUM 9.0 09/22/2021         Significant Diagnostic Studies    Radiology:   XR CHEST PORTABLE   Final Result   Subtle, peripheral airspace disease.   Pattern is common for

## 2021-09-22 NOTE — PROGRESS NOTES
Discharge and follow up instructions given, follow up instructions given, Iv discontinued, will discharge patient.  Lashell Quinones RN

## 2021-09-22 NOTE — DISCHARGE INSTR - COC
Continuity of Care Form    Patient Name: Wesley Billing   :  1960  MRN:  [id number]    Admit date:  2021  Discharge date:  ***    Code Status Order: Full Code   Advance Directives:     Admitting Physician:  Jeramie Mina MD  PCP: No primary care provider on file. Discharging Nurse: [facility] Unit/Room#: [id number]-[id number]/[id number]  Discharging Unit Phone Number: ***    Emergency Contact:   Extended Emergency Contact Information  Primary Emergency Contact: [facility] Phone: [phone]  Relation: Child  Secondary Emergency Contact: [facility] Phone: [phone]  Relation: Child    Past Surgical History:  Past Surgical History:   Procedure Laterality Date    TUBAL LIGATION      also reversed       Immunization History: There is no immunization history on file for this patient.     Active Problems:  Patient Active Problem List   Diagnosis Code    Positive blood culture R78.81    Elevated lactic acid level R79.89    Pneumonia due to COVID-19 virus U07.1, J12.82    Lactic acidosis E87.2    Transaminitis R74.01    Mediastinal lymphadenopathy R59.0    Morbid obesity due to excess calories (HCC) E66.01    Weight loss counseling, encounter for Z71.3       Isolation/Infection:   Isolation          Droplet Plus  C Diff Contact        Patient Infection Status     Infection Onset Added Last Indicated Last Indicated By Review Planned Expiration Resolved Resolved By    C-diff Rule Out 21 Clostridium difficile toxin/antigen (Ordered)        COVID-19 21 COVID-19 21      Resolved    COVID-19 Rule Out 21 COVID-19 (Ordered)   21 Rule-Out Test Resulted          Nurse Assessment:  Last Vital Signs: /79   Pulse 82   Temp 98.1 °F (36.7 °C) (Oral)   Resp 16   Ht 5' 3\" (1.6 m)   Wt (!) 307 lb 14.4 oz (139.7 kg)   SpO2 98%   BMI 54.54 kg/m²     Last documented pain score (0-10 scale): Pain Level: 0  Last Weight:   Wt Readings from Last 1 Encounters:   21 (!) 307 lb 14.4 oz (139.7 kg)     Mental Status:  {IP PT MENTAL STATUS:51650}    IV Access:  { SAULO IV ACCESS:415981281}    Nursing Mobility/ADLs:  Walking   {CHP DME CNBH:197174938}  Transfer  {CHP DME EXLW:420317666}  Bathing  {CHP DME ISLH:499828384}  Dressing  {CHP DME VRAV:976824737}  Toileting  {CHP DME TXLN:429839853}  Feeding  {P DME WWLR:237336999}  Med Admin  {[facility] DME HJRA:293193337}  Med Delivery   { SAULO MED Delivery:041451831}    Wound Care Documentation and Therapy:        Elimination:  Continence:   · Bowel: {YES / NJ:21264}  · Bladder: {YES / BW:69952}  Urinary Catheter: {Urinary Catheter:617373536}   Colostomy/Ileostomy/Ileal Conduit: {YES / MCNEIL:60929}       Date of Last BM: ***  No intake or output data in the 24 hours ending 21 1000  No intake/output data recorded.     Safety Concerns:     [address] [employer] Safety Concerns:506486457}    Impairments/Disabilities:      [employer] Impairments/Disabilities:856452425}    Nutrition Therapy:  Current Nutrition Therapy:   [employer] Diet List:595362931}    Routes of Feeding: {P DME Other Feedings:445329575}  Liquids: {Slp liquid thickness:68439}  Daily Fluid Restriction: {P DME Yes amt example:422964603}  Last Modified Barium Swallow with Video (Video Swallowing Test): {Done Not Done XVPP:726447651}    Treatments at the Time of Hospital Discharge:   Respiratory Treatments: ***  Oxygen Therapy:  {Therapy; copd oxygen:60255}  Ventilator:    {[facility] Vent ZKNL:108863928}    Rehab Therapies:   Weight Bearing Status/Restrictions: [employer] Weight Bearin}  Other Medical Equipment (for information only, NOT a DME order):  {EQUIPMENT:015111948}  Other Treatments: ***    Patient's personal belongings (please select all that are sent with patient):  {CHP DME Belongings:263439505}    RN SIGNATURE:  {Esignature:139407789}    CASE MANAGEMENT/SOCIAL WORK SECTION    Inpatient Status Date: ***    Readmission Risk Assessment Score:  Readmission Risk              Risk of Unplanned Readmission:  12           Discharging to Facility/ Agency               Dialysis Facility (if applicable)   · Name:  · Address:  · Dialysis Schedule:  · Phone:  · Fax:    / signature: {Esignature:254464557}    PHYSICIAN SECTION    Prognosis: {Prognosis:4370277877}    Condition at Discharge: Margareth Andrade Patient Condition:402426380}    Rehab Potential (if transferring to Rehab): {Prognosis:0317626316}    Recommended Labs or Other Treatments After Discharge: ***    Physician Certification: I certify the above information and transfer of Augustine Johnson  is necessary for the continuing treatment of the diagnosis listed and that she requires {Admit to Appropriate Level of Care:54559} for {GREATER/LESS:202329795} 30 days.      Update Admission H&P: {CHP DME Changes in TXYBJ:671173607}    PHYSICIAN SIGNATURE:  {Esignature:030561287}

## 2021-09-22 NOTE — CARE COORDINATION
CM called [facility] home care and informed Erick Christina  that pt will not need home care services. Pt will be discharged after ultrasound of liver is done.     Patient discharged 9/22/2021 to home with [facility] pamphlet for follow up care   All discharge needs met per case management    Jyoti Walker, RN, BSN  [phone]

## 2021-09-22 NOTE — CARE COORDINATION
CM called Ann-Marie with [employer] [phone] and notified her that per IDR and ID note pt will not need IV Abx at d/c.    CM called pt's room and no answer. No PCP listed. HARSH gave RN [facility] pamphlet for pt to follow up with after discharge.     Kevyn Harris RN, BSN  [phone]

## 2021-09-23 ENCOUNTER — CARE COORDINATION (OUTPATIENT)
Dept: CASE MANAGEMENT | Age: 61
End: 2021-09-23

## 2021-09-23 DIAGNOSIS — J12.82 PNEUMONIA DUE TO COVID-19 VIRUS: Primary | ICD-10-CM

## 2021-09-23 DIAGNOSIS — U07.1 PNEUMONIA DUE TO COVID-19 VIRUS: Primary | ICD-10-CM

## 2021-09-23 NOTE — CARE COORDINATION
[facility] Transitions Initial Follow Up Call:  Patient is doing well, she reports \"small cough\", some SOB, denies fever. Discussed discharge instructions and reviewed medications. Patient will call PCP to schedule follow up. She has not seen this provider in some time due to job loss and no insurance. Patient requested that Davion Sparrow. be added as primary decision maker in ACP documents. CTN will continue with outreach follow up calls. Call within 2 business days of discharge: Yes    Patient: Halina Lyons Patient : 1960   MRN: [id number]  Reason for Admission: COVID PNA  Discharge Date: 21 RARS: Readmission Risk Score: 12      Last Discharge [facility]       Complaint Diagnosis Description Type Department Provider    21 Abnormal Lab Severe sepsis ([facility] [address] . .. ED to Hosp-Admission (Discharged) (ADMITTED) [facility] 5T Silke Loera MD; Adebayo Larsen... Spoke with: Halina Lyons      Patient contacted regarding COVID-19 risk, exposure, diagnosis, pulse oximeter ordered at discharge and monoclonal antibody infusion follow up. Discussed COVID-19 related testing which was available at this time. Test results were positive. Patient informed of results, if available? Yes. Care Transition Nurse contacted the patient by telephone to perform post discharge assessment. Call within 2 business days -of discharge: Yes. Verified name and  with patient as identifiers. Provided introduction to self, and explanation of the CTN/ACM role, and reason for call due to risk factors for infection and/or exposure to COVID-19. Symptoms reviewed with patient who verbalized the following symptoms: cough and shortness of breath. Due to no new or worsening symptoms encounter was not routed to provider for escalation. Discussed follow-up appointments.  If no appointment was previously scheduled, appointment scheduling offered: No, patient will be calling to re-establish with PCP.  [facility] No future appointments. Non-[facility] follow up appointment(s):      Advance Care Planning:   Does patient have an Advance Directive:  reviewed and current. Educated patient about risk for severe COVID-19 due to risk factors according to CDC guidelines. CTN reviewed discharge instructions, medical action plan and red flag symptoms with the patient who verbalized understanding. Discussed COVID vaccination status: No. Education provided on COVID-19 vaccination as appropriate. Discussed exposure protocols and quarantine with CDC Guidelines. Patient was given an opportunity to verbalize any questions and concerns and agrees to contact CTN or health care provider for questions related to their healthcare. Reviewed and educated patient on any new and changed medications related to discharge diagnosis     Was patient discharged with a pulse oximeter? No        CTN provided contact information. Plan for follow-up call in 5-7 days based on severity of symptoms and risk factors. Non-face-to-face services provided:  Obtained and reviewed discharge summary and/or continuity of care documents    Care Transitions 24 Hour Call    Do you have any ongoing symptoms?: No  Do you have a copy of your discharge instructions?: Yes  Do you have all of your prescriptions and are they filled?: Yes  Have you been contacted by a [address]?: No  Have you scheduled your follow up appointment?: No  Were you discharged with any Home Care or Post Acute Services: Yes  Post Acute Services: Home Health  Do you feel like you have everything you need to keep you well at home?: Yes  Care Transitions Interventions         Follow Up  No future appointments.     Ulises Galvez RN

## 2021-09-24 LAB
BLOOD CULTURE, ROUTINE: NORMAL
CULTURE, BLOOD 2: NORMAL

## 2021-09-30 ENCOUNTER — CARE COORDINATION (OUTPATIENT)
Dept: CASE MANAGEMENT | Age: 61
End: 2021-09-30

## 2021-09-30 NOTE — CARE COORDINATION
Follow up outreach call attempt, no answer. CTN left  with contact information and request for return call. CTN will continue with outreach call attempts.     MENA Baires, RN   Care Transition Nurse  Mobile: [phone]

## 2021-10-05 ENCOUNTER — CARE COORDINATION (OUTPATIENT)
Dept: CASE MANAGEMENT | Age: 61
End: 2021-10-05

## 2021-10-05 NOTE — CARE COORDINATION
Second attempt for follow up outreach call attempt, no answer. CTN left VM with contact information and request for return call. CTN will resolve episode and remain available.

## 2022-06-05 NOTE — ED PROVIDER NOTES
[facility]        Pt Name: Rod Baez  MRN: [id number]  [place] 1960  Date of evaluation: 9/20/2021  Provider: MODESTO Sauceda  PCP: No primary care provider on file. Note Started: 12:31 PM EDT        I have seen and evaluated this patient with my supervising physician Cristela Hampton MD.    [address] [place] [address]       Chief Complaint   Patient presents with    Abnormal Lab     Told to return to ER for bactria in blood culture. HISTORY OF PRESENT ILLNESS   (Location, Timing/Onset, Context/Setting, Quality, Duration, Modifying Factors, Severity, Associated Signs and Symptoms)  Note limiting factors. Chief Complaint: Abnormal blood cultures    Rod Baez is a 63 y.o. female with past medical history of arthritis, hypertension and bursitis who presents to the ED with complaint of abnormal labs. Patient was seen here in the emergency department last week. States she tested positive for Covid. Was discharged home with albuterol and azithromycin. Patient states she was called and told to return to the emergency department because both of her blood cultures were abnormal.  Patient states she is \"scared\". Patient states she has been feeling well. Denies any fever, chills, decreased activity, decreased energy or decreased oral intake. Denies loss of taste or smell. Patient that she does have a slight nonproductive cough. Denies any chest pain or shortness of breath. Patient that she does have some diarrhea. Denies any urinary symptoms, abdominal pain, nausea/vomiting or rashes/lesions. Denies headache or lightheadedness/dizziness. Patient denies any recent surgeries or procedures. Patient denies any significant IV access other than recent blood draw. Denies any pain. Nursing Notes were all reviewed and agreed with or any disagreements were addressed in the HPI.     REVIEW OF SYSTEMS    (2-9 systems for level 4, 10 or more for level 5)     Review of Systems   Constitutional: Negative for activity change, appetite change, chills, diaphoresis, fatigue and fever. Eyes: Negative for photophobia and visual disturbance. Respiratory: Positive for cough. Negative for chest tightness and shortness of breath. Cardiovascular: Negative. Negative for chest pain, palpitations and leg swelling. Gastrointestinal: Positive for diarrhea. Negative for abdominal pain, constipation, nausea and vomiting. Genitourinary: Negative for decreased urine volume, difficulty urinating, dysuria, flank pain, frequency, hematuria and urgency. Musculoskeletal: Negative for arthralgias, back pain, myalgias, neck pain and neck stiffness. Skin: Negative for color change, pallor, rash and wound. Neurological: Negative for dizziness, tremors, seizures, syncope, facial asymmetry, speech difficulty, weakness, light-headedness, numbness and headaches. Positives and Pertinent negatives as per HPI. Except as noted above in the ROS, all other systems were reviewed and negative. PAST MEDICAL HISTORY     Past Medical History:   Diagnosis Date    Arthritis     Bursitis     left shoulder    Hypertension          SURGICAL HISTORY     Past Surgical History:   Procedure Laterality Date    TUBAL LIGATION      also reversed         CURRENTMEDICATIONS       Previous Medications    ALBUTEROL SULFATE HFA (VENTOLIN HFA) 108 (90 BASE) MCG/ACT INHALER    Inhale 2 puffs into the lungs 4 times daily as needed for Wheezing    ASPIRIN EC 81 MG EC TABLET    Take 1 tablet by mouth daily    AZITHROMYCIN (ZITHROMAX) 250 MG TABLET    Take 1 tablet by mouth See Admin Instructions for 5 days 500mg on day 1 followed by 250mg on days 2 - 5    LISINOPRIL-HYDROCHLOROTHIAZIDE (PRINZIDE;ZESTORETIC) 20-12.5 MG PER TABLET    Take 1 tablet by mouth daily         ALLERGIES     Patient has no known allergies. FAMILYHISTORY     History reviewed.  No pertinent family history. SOCIAL HISTORY       Social History     Tobacco Use    Smoking status: Former Smoker     Packs/day: 0.25     Quit date: 3/5/2017     Years since quittin.5    Smokeless tobacco: Never Used   Substance Use Topics    Alcohol use: No    Drug use: No       SCREENINGS             PHYSICAL EXAM    (up to 7 for level 4, 8 or more for level 5)     ED Triage Vitals   BP Temp Temp Source Pulse Resp SpO2 Height Weight   21 1217 21 1210 21 1210 21 1210 21 1210 21 1210 21 1210 21 1210   (!) 194/109 98.7 °F (37.1 °C) Oral 104 16 97 % 5' 3\" (1.6 m) (!) 305 lb 12.8 oz (138.7 kg)       Physical Exam  Constitutional:       General: She is not in acute distress. Appearance: Normal appearance. She is well-developed. She is not ill-appearing, toxic-appearing or diaphoretic. Comments: BMI 54.17. HENT:      Head: Normocephalic and atraumatic. Right Ear: External ear normal.      Left Ear: External ear normal.   Eyes:      General:         Right eye: No discharge. Left eye: No discharge. Conjunctiva/sclera: Conjunctivae normal.   Cardiovascular:      Rate and Rhythm: Regular rhythm. Tachycardia present. Pulses: Normal pulses. Heart sounds: Normal heart sounds. No murmur heard. No friction rub. No gallop. Comments: 2+ radial pulses bilaterally. No pedal edema. No calf tenderness. No JVD. Pulmonary:      Effort: Pulmonary effort is normal. No respiratory distress. Breath sounds: Normal breath sounds. No stridor. No wheezing, rhonchi or rales. Chest:      Chest wall: No tenderness. Abdominal:      General: Abdomen is flat. Bowel sounds are normal. There is no distension. Palpations: Abdomen is soft. There is no mass. Tenderness: There is no abdominal tenderness. There is no right CVA tenderness, left CVA tenderness, guarding or rebound.  Negative signs include Starr's sign and McBurney's 1 sign.      Hernia: No hernia is present. Musculoskeletal:         General: Normal range of motion. Cervical back: Normal range of motion and neck supple. No rigidity or tenderness. Lymphadenopathy:      Cervical: No cervical adenopathy. Skin:     General: Skin is warm and dry. Coloration: Skin is not pale. Findings: No erythema or rash. Neurological:      Mental Status: She is alert and oriented to person, place, and time.    Psychiatric:         Behavior: Behavior normal.         DIAGNOSTIC RESULTS   LABS:    Labs Reviewed   CBC WITH AUTO DIFFERENTIAL - Abnormal; Notable for the following components:       Result Value    WBC 3.7 (*)     All other components within normal limits    Narrative:     Performed at:  [facility]  [address] [facility],  [address], [address]   Phone [phone]   COMPREHENSIVE METABOLIC PANEL W/ REFLEX TO MG FOR LOW K - Abnormal; Notable for the following components:    Potassium reflex Magnesium 3.4 (*)     Glucose 125 (*)     Alkaline Phosphatase 188 (*)      (*)      (*)     All other components within normal limits    Narrative:     Performed at:  [facility] [address],  [address], [address]   Phone [phone]   LACTATE, SEPSIS - Abnormal; Notable for the following components:    Lactic Acid, Sepsis 2.3 (*)     All other components within normal limits    Narrative:     Performed at:  [facility]  [address] [facility],  [address], [address]   Phone [phone]   CULTURE, BLOOD 2   CULTURE, BLOOD 1   PROCALCITONIN    Narrative:     Performed at:  [facility]  [address] [facility],  [address], [address]   Phone [phone]   PROTIME-INR    Narrative:     Performed at:  [facility]  [address] [facility],  [address], [address]   Phone [phone]   MAGNESIUM    Narrative:     Performed at:  [facility] [place] [address] [address], [address]   Phone [phone]   LACTATE, SEPSIS   URINE RT REFLEX TO CULTURE       When ordered only abnormal lab results are displayed. All other labs were within normal range or not returned as of this dictation. EKG: When ordered, EKG's are interpreted by the Emergency Department Physician in the absence of a cardiologist.  Please see their note for interpretation of EKG. RADIOLOGY:   Non-plain film images such as CT, Ultrasound and MRI are read by the radiologist. Plain radiographic images are visualized and preliminarily interpreted by the ED Provider with the below findings:        Interpretation per the Radiologist below, if available at the time of this note:    XR CHEST PORTABLE   Final Result   Subtle, peripheral airspace disease. Pattern is common for COVID pneumonia. Other etiologies of pneumonia, typical or atypical etiology or eosinophilic   pneumonia are other possibilities. CT CHEST WO CONTRAST    Result Date: 9/17/2021  EXAMINATION: CT OF THE CHEST WITHOUT CONTRAST 9/16/2021 11:28 pm TECHNIQUE: CT of the chest was performed without the administration of intravenous contrast. Multiplanar reformatted images are provided for review. Dose modulation, iterative reconstruction, and/or weight based adjustment of the mA/kV was utilized to reduce the radiation dose to as low as reasonably achievable. COMPARISON: CT abdomen and pelvis 09/16/2021 HISTORY: ORDERING SYSTEM PROVIDED HISTORY: abnormal ct abd and cxr TECHNOLOGIST PROVIDED HISTORY: Reason for exam:->abnormal ct abd and cxr Decision Support Exception - unselect if not a suspected or confirmed emergency medical condition->Emergency Medical Condition (MA) Reason for Exam: abnormal ct abd and cxr Acuity: Acute Type of Exam: Initial Relevant Medical/Surgical History: Abdominal Pain (pt states she has acid going on in her stomach for about a year.   PT states she has TECHNOLOGIST PROVIDED HISTORY: Additional Contrast?->None Reason for exam:->Abdominal pain Decision Support Exception - unselect if not a suspected or confirmed emergency medical condition->Emergency Medical Condition (MA) Reason for Exam: abd pain Acuity: Unknown Type of Exam: Unknown FINDINGS: Lower Chest: There are multiple scattered foci of nonspecific somewhat nodular ground-glass opacity within the bilateral lung bases, the largest measuring approximately 2.2 cm within the lingula. There is a calcified granuloma within the right lower lobe Organs: There is diffuse hepatic steatosis. The liver is otherwise unremarkable. There are calcified splenic granulomata. The spleen is otherwise unremarkable. The pancreas is normal.  The gallbladder is unremarkable. The adrenal glands are normal bilaterally. There is a punctate 2 mm calculus within the left kidney lower pole, though no evidence of a ureteral calculus or hydronephrosis. The bilateral kidneys are otherwise unremarkable, without inflammatory change or mass lesion. GI/Bowel: Evaluation of the hollow GI tract demonstrates a small sliding-type hiatal hernia. There is no evidence of abnormal bowel wall thickening, dilatation, or obstruction. The appendix is normal.  There is colonic diverticulosis, though no evidence of diverticulitis. Pelvis: The urinary bladder is normal.  The uterus and bilateral adnexa are unremarkable. Scattered phleboliths are noted. There is no free pelvic fluid or pathologic pelvic lymphadenopathy. Peritoneum/Retroperitoneum: No intraperitoneal free air or free fluid is identified. No pathologic lymphadenopathy is seen. The abdominal aorta is unremarkable. There is diastasis recti, without a significant abdominal wall hernia evident. Bones/Soft Tissues: There is degenerative change throughout the thoracolumbar spine. No osteolytic or osteoblastic lesion is seen.   There is mild bilateral sacroiliitis, right greater than left.     1. No acute process within the abdomen or pelvis. 2. Punctate nonobstructing right nephrolithiasis, without evidence of a ureteral calculus or hydronephrosis. 3. Colonic diverticulosis, without evidence of diverticulitis. 4. Diffuse hepatic steatosis. 5. Small sliding-type hiatal hernia. 6. Multiple scattered ground-glass nodular opacities throughout the bilateral lung bases, the largest measuring 2.2 cm within the lingula. Suggest further characterization with immediate chest CT evaluation. XR CHEST PORTABLE    Result Date: 9/16/2021  EXAMINATION: ONE XRAY VIEW OF THE CHEST 9/16/2021 2:04 pm COMPARISON: Chest radiograph April 24, 2017. HISTORY: ORDERING SYSTEM PROVIDED HISTORY: Shortness of breath, wheezing TECHNOLOGIST PROVIDED HISTORY: Reason for exam:->Shortness of breath, wheezing Acuity: Unknown FINDINGS: There mild diffuse bilateral hazy opacities with indistinctness of the pulmonary vasculature. No pneumothorax or pleural effusion identified. Cardiac and mediastinal contours are without acute process. No acute osseous abnormality. Bilateral airspace disease suggestive of edema. Superimposed pneumonia not excluded. PROCEDURES   Unless otherwise noted below, none     Procedures    CRITICAL CARE TIME   N/A    CONSULTS:  PHARMACY TO DOSE VANCOMYCIN  IP CONSULT TO INFECTIOUS DISEASES      EMERGENCY DEPARTMENT COURSE and DIFFERENTIAL DIAGNOSIS/MDM:   Vitals:    Vitals:    09/20/21 1210 09/20/21 1217   BP:  (!) 194/109   Pulse: 104    Resp: 16    Temp: 98.7 °F (37.1 °C)    TempSrc: Oral    SpO2: 97%    Weight: (!) 305 lb 12.8 oz (138.7 kg)    Height: 5' 3\" (1.6 m)        Patient was given the following medications:  Medications   vancomycin 1000 mg IVPB in 250 mL D5W addavial (has no administration in time range)           Patient is a 14-year-old female who presents to the ED with complaint of abnormal labs. Patient was seen last week and tested positive for COVID-19.   Patient had blood cultures that came back positive 2 out of 2 blood culture bottles for staph. Was sent to the ED for evaluation of bacteremia. Patient states she has a slight cough otherwise feels okay. Tachycardic upon arrival but afebrile remaining vital signs unremarkable. CBC showed some leukopenia at 3.7. There is no lymphopenia. CMP did show some abnormalities that appear consistent with previously documented results. Lactic acid was 2.3. Blood cultures obtained and pending at this time. Procalcitonin normal.  INR normal.  Chest x-ray showed peripheral airspace disease concerning for Covid pneumonia. Patient is normal procalcitonin and believe most likely viral.  We will give vancomycin for treatment of potential bacteremia based on blood culture susceptibility/sensitivity. And will order repeat blood cultures for further evaluation. We will plan on admission. Case discussed with hospital service who graciously agreed accept patient for admission at this time. SEP-1 CORE MEASURE DATA    Classification: exclude from core measure    Exclusion criteria: the patient is NOT to be included for sepsis due to:  Felt to be multifactorial with known viral infection/Covid and questionable bacteremia    FINAL IMPRESSION      1. Severe sepsis ([address]    2. Bacteremia    3. Elevated lactic acid level    4. COVID-19          DISPOSITION/PLAN   DISPOSITION Admitted 09/20/2021 05:05:36 PM      PATIENT REFERRED TO:  No follow-up provider specified.     DISCHARGE MEDICATIONS:  New Prescriptions    No medications on file       DISCONTINUED MEDICATIONS:  Discontinued Medications    No medications on file              (Please note that portions of this note were completed with a voice recognition program.  Efforts were made to edit the dictations but occasionally words are mis-transcribed.)    MODESTO Foote (electronically signed)          MODESTO Alvarado  09/20/21 3748 [facility] Discharge DX:	Laceration of right ankle

## 2024-06-17 ENCOUNTER — APPOINTMENT (OUTPATIENT)
Dept: GENERAL RADIOLOGY | Age: 64
End: 2024-06-17
Payer: MEDICAID

## 2024-06-17 ENCOUNTER — HOSPITAL ENCOUNTER (EMERGENCY)
Age: 64
Discharge: HOME | End: 2024-06-17
Payer: MEDICAID

## 2024-06-17 ENCOUNTER — APPOINTMENT (OUTPATIENT)
Dept: VASCULAR LAB | Age: 64
End: 2024-06-17
Payer: MEDICAID

## 2024-06-17 VITALS
SYSTOLIC BLOOD PRESSURE: 161 MMHG | BODY MASS INDEX: 43.79 KG/M2 | OXYGEN SATURATION: 97 % | DIASTOLIC BLOOD PRESSURE: 88 MMHG | RESPIRATION RATE: 16 BRPM | WEIGHT: 238 LBS | HEIGHT: 62 IN | TEMPERATURE: 98.9 F | HEART RATE: 75 BPM

## 2024-06-17 DIAGNOSIS — N30.00 ACUTE CYSTITIS WITHOUT HEMATURIA: ICD-10-CM

## 2024-06-17 DIAGNOSIS — R60.0 BILATERAL LEG EDEMA: Primary | ICD-10-CM

## 2024-06-17 LAB
ALBUMIN SERPL-MCNC: 4.2 G/DL (ref 3.4–5)
ALBUMIN/GLOB SERPL: 1.2 {RATIO} (ref 1.1–2.2)
ALP SERPL-CCNC: 131 U/L (ref 40–129)
ALT SERPL-CCNC: 15 U/L (ref 10–40)
ANION GAP SERPL CALCULATED.3IONS-SCNC: 13 MMOL/L (ref 3–16)
AST SERPL-CCNC: 19 U/L (ref 15–37)
BASOPHILS # BLD: 0 K/UL (ref 0–0.2)
BASOPHILS NFR BLD: 1.2 %
BILIRUB SERPL-MCNC: 0.3 MG/DL (ref 0–1)
BILIRUB UR QL STRIP.AUTO: NEGATIVE
BUN SERPL-MCNC: 12 MG/DL (ref 7–20)
CALCIUM SERPL-MCNC: 9.6 MG/DL (ref 8.3–10.6)
CHLORIDE SERPL-SCNC: 104 MMOL/L (ref 99–110)
CLARITY UR: ABNORMAL
CO2 SERPL-SCNC: 24 MMOL/L (ref 21–32)
COLOR UR: YELLOW
CREAT SERPL-MCNC: 0.7 MG/DL (ref 0.6–1.2)
DEPRECATED RDW RBC AUTO: 14.7 % (ref 12.4–15.4)
EKG ATRIAL RATE: 62 BPM
EKG DIAGNOSIS: NORMAL
EKG P AXIS: 58 DEGREES
EKG P-R INTERVAL: 150 MS
EKG Q-T INTERVAL: 438 MS
EKG QRS DURATION: 146 MS
EKG QTC CALCULATION (BAZETT): 444 MS
EKG R AXIS: 10 DEGREES
EKG T AXIS: 32 DEGREES
EKG VENTRICULAR RATE: 62 BPM
EOSINOPHIL # BLD: 0.1 K/UL (ref 0–0.6)
EOSINOPHIL NFR BLD: 3.6 %
EPI CELLS #/AREA URNS AUTO: 5 /HPF (ref 0–5)
GFR SERPLBLD CREATININE-BSD FMLA CKD-EPI: >90 ML/MIN/{1.73_M2}
GLUCOSE SERPL-MCNC: 103 MG/DL (ref 70–99)
GLUCOSE UR STRIP.AUTO-MCNC: NEGATIVE MG/DL
HCT VFR BLD AUTO: 36.9 % (ref 36–48)
HGB BLD-MCNC: 12.5 G/DL (ref 12–16)
HGB UR QL STRIP.AUTO: NEGATIVE
HYALINE CASTS #/AREA URNS AUTO: 1 /LPF (ref 0–8)
KETONES UR STRIP.AUTO-MCNC: ABNORMAL MG/DL
LEUKOCYTE ESTERASE UR QL STRIP.AUTO: ABNORMAL
LYMPHOCYTES # BLD: 1.8 K/UL (ref 1–5.1)
LYMPHOCYTES NFR BLD: 50.5 %
MCH RBC QN AUTO: 31.4 PG (ref 26–34)
MCHC RBC AUTO-ENTMCNC: 33.8 G/DL (ref 31–36)
MCV RBC AUTO: 93 FL (ref 80–100)
MONOCYTES # BLD: 0.3 K/UL (ref 0–1.3)
MONOCYTES NFR BLD: 8.3 %
NEUTROPHILS # BLD: 1.3 K/UL (ref 1.7–7.7)
NEUTROPHILS NFR BLD: 36.4 %
NITRITE UR QL STRIP.AUTO: POSITIVE
NT-PROBNP SERPL-MCNC: 145 PG/ML (ref 0–124)
PH UR STRIP.AUTO: 6 [PH] (ref 5–8)
PLATELET # BLD AUTO: 254 K/UL (ref 135–450)
PMV BLD AUTO: 9.2 FL (ref 5–10.5)
POTASSIUM SERPL-SCNC: 4.1 MMOL/L (ref 3.5–5.1)
PROT SERPL-MCNC: 7.8 G/DL (ref 6.4–8.2)
PROT UR STRIP.AUTO-MCNC: NEGATIVE MG/DL
RBC # BLD AUTO: 3.97 M/UL (ref 4–5.2)
RBC CLUMPS #/AREA URNS AUTO: 1 /HPF (ref 0–4)
SODIUM SERPL-SCNC: 141 MMOL/L (ref 136–145)
SP GR UR STRIP.AUTO: 1.02 (ref 1–1.03)
TROPONIN, HIGH SENSITIVITY: 8 NG/L (ref 0–14)
UA COMPLETE W REFLEX CULTURE PNL UR: YES
UA DIPSTICK W REFLEX MICRO PNL UR: YES
URN SPEC COLLECT METH UR: ABNORMAL
UROBILINOGEN UR STRIP-ACNC: 1 E.U./DL
WBC # BLD AUTO: 3.5 K/UL (ref 4–11)
WBC #/AREA URNS AUTO: 10 /HPF (ref 0–5)

## 2024-06-17 PROCEDURE — 80053 COMPREHEN METABOLIC PANEL: CPT

## 2024-06-17 PROCEDURE — 93010 ELECTROCARDIOGRAM REPORT: CPT | Performed by: INTERNAL MEDICINE

## 2024-06-17 PROCEDURE — 87186 SC STD MICRODIL/AGAR DIL: CPT

## 2024-06-17 PROCEDURE — 87086 URINE CULTURE/COLONY COUNT: CPT

## 2024-06-17 PROCEDURE — 83880 ASSAY OF NATRIURETIC PEPTIDE: CPT

## 2024-06-17 PROCEDURE — 85025 COMPLETE CBC W/AUTO DIFF WBC: CPT

## 2024-06-17 PROCEDURE — 87077 CULTURE AEROBIC IDENTIFY: CPT

## 2024-06-17 PROCEDURE — 99285 EMERGENCY DEPT VISIT HI MDM: CPT

## 2024-06-17 PROCEDURE — 71045 X-RAY EXAM CHEST 1 VIEW: CPT

## 2024-06-17 PROCEDURE — 93970 EXTREMITY STUDY: CPT

## 2024-06-17 PROCEDURE — 93005 ELECTROCARDIOGRAM TRACING: CPT | Performed by: PHYSICIAN ASSISTANT

## 2024-06-17 PROCEDURE — 81001 URINALYSIS AUTO W/SCOPE: CPT

## 2024-06-17 PROCEDURE — 84484 ASSAY OF TROPONIN QUANT: CPT

## 2024-06-17 RX ORDER — CEPHALEXIN 500 MG/1
500 CAPSULE ORAL 4 TIMES DAILY
Qty: 28 CAPSULE | Refills: 0 | Status: SHIPPED | OUTPATIENT
Start: 2024-06-17 | End: 2024-06-24

## 2024-06-17 RX ORDER — HYDROCHLOROTHIAZIDE 25 MG/1
25 TABLET ORAL EVERY MORNING
Qty: 30 TABLET | Refills: 0 | Status: SHIPPED | OUTPATIENT
Start: 2024-06-17

## 2024-06-17 ASSESSMENT — ENCOUNTER SYMPTOMS
SHORTNESS OF BREATH: 0
VOMITING: 0
STRIDOR: 0
ABDOMINAL PAIN: 0
COUGH: 0
NAUSEA: 0
CONSTIPATION: 0
DIARRHEA: 0
WHEEZING: 0

## 2024-06-17 ASSESSMENT — PAIN - FUNCTIONAL ASSESSMENT: PAIN_FUNCTIONAL_ASSESSMENT: 0-10

## 2024-06-17 ASSESSMENT — PAIN SCALES - GENERAL: PAINLEVEL_OUTOF10: 0

## 2024-06-17 NOTE — ED PROVIDER NOTES
obstruction, perforation, abscess, mesenteric ischemia, AAA, dissection, cholecystitis, cholangitis, pancreatitis, appendicitis, C. diff colitis, diverticulitis, volvulus, incarcerated hernia, necrotizing fasciitis, TOA, ovarian torsion, PID, ectopic pregnancy, kalyn sandee Jhon syndrome,  incarcerated hernia, Cheryl gangrene, pyelonephritis, perinephric abscess, kidney stone, urosepsis, fistula, intussusception, preeclampsia, help syndrome,  or other concerning pathology.    Appropriate for outpatient management        I am the Primary Clinician of Record.    FINAL IMPRESSION      1. Bilateral leg edema    2. Acute cystitis without hematuria          DISPOSITION/PLAN     DISPOSITION Decision To Discharge 06/17/2024 12:53:48 PM      PATIENT REFERRED TO:  follow up with your PCP    In 3 days      [facility] Emergency Department  [address] [place] [address]  [phone]    If symptoms worsen      DISCHARGE MEDICATIONS:  New Prescriptions    CEPHALEXIN (KEFLEX) 500 MG CAPSULE    Take 1 capsule by mouth 4 times daily for 7 days    HYDROCHLOROTHIAZIDE (HYDRODIURIL) 25 MG TABLET    Take 1 tablet by mouth every morning       DISCONTINUED MEDICATIONS:  Discontinued Medications    LISINOPRIL-HYDROCHLOROTHIAZIDE (PRINZIDE;ZESTORETIC) 20-12.5 MG PER TABLET    Take 1 tablet by mouth daily              (Please note that portions of this note were completed with a voice recognition program.  Efforts were made to edit the dictations but occasionally words are mis-transcribed.)    Salome Perry PA-C (electronically signed)            Salome Perry PA-C  06/17/24 0229

## 2024-06-18 LAB — ECHO BSA: 2.17 M2

## 2024-06-19 LAB
BACTERIA UR CULT: ABNORMAL
BACTERIA UR CULT: ABNORMAL
ORGANISM: ABNORMAL